# Patient Record
Sex: FEMALE | Race: WHITE | Employment: PART TIME | URBAN - METROPOLITAN AREA
[De-identification: names, ages, dates, MRNs, and addresses within clinical notes are randomized per-mention and may not be internally consistent; named-entity substitution may affect disease eponyms.]

---

## 2017-09-20 ENCOUNTER — HOSPITAL ENCOUNTER (OUTPATIENT)
Dept: MAMMOGRAPHY | Age: 57
Discharge: HOME | End: 2017-09-20
Attending: NURSE PRACTITIONER | Admitting: NURSE PRACTITIONER

## 2017-09-20 DIAGNOSIS — Z12.31 VISIT FOR SCREENING MAMMOGRAM: ICD-10-CM

## 2021-01-12 ENCOUNTER — HOSPITAL ENCOUNTER (OUTPATIENT)
Dept: MAMMOGRAPHY | Age: 61
Discharge: HOME | End: 2021-01-12
Payer: COMMERCIAL

## 2021-01-12 ENCOUNTER — HOSPITAL ENCOUNTER (OUTPATIENT)
Dept: ULTRASOUND IMAGING | Age: 61
Discharge: HOME | End: 2021-01-12
Payer: COMMERCIAL

## 2021-01-12 DIAGNOSIS — R22.1 NECK MASS: ICD-10-CM

## 2021-01-12 DIAGNOSIS — Z12.31 BREAST CANCER SCREENING BY MAMMOGRAM: ICD-10-CM

## 2021-01-12 PROCEDURE — 76536 US EXAM OF HEAD AND NECK: CPT

## 2021-01-12 PROCEDURE — 77067 SCR MAMMO BI INCL CAD: CPT

## 2021-02-19 ENCOUNTER — HOSPITAL ENCOUNTER (OUTPATIENT)
Dept: WOMENS IMAGING | Age: 61
Discharge: HOME | End: 2021-02-19
Payer: COMMERCIAL

## 2021-02-19 DIAGNOSIS — R92.8 ABNORMAL MAMMOGRAM: ICD-10-CM

## 2021-02-19 PROCEDURE — 76642 ULTRASOUND BREAST LIMITED: CPT

## 2021-02-19 PROCEDURE — G0279 TOMOSYNTHESIS, MAMMO: HCPCS

## 2021-02-19 NOTE — PROGRESS NOTES
[place]    [facility], [address] [place] [address]   Phone: [phone]         ULTRASOUND BIOPSY EDUCATION    NAME:  Anna Chávez OF BIRTH:  1960   MEDICAL RECORD NUMBER:  [id number]   TODAY'S DATE:  2/19/2021    Referring Physician: Dr. Beverley Lopez    Procedure: U/S Core Bx and Stereotactic Bx    Bilateral   2 right breast ultrasound biopsies  1 right axillary lymph node biopsy  1 right breast stereotactic biopsy  2 left breast stereotactic biopsies    Date of biopsy: 2/24/20201    Patient taking blood thinners: yes - ASA 81mg, holding 2 days prior    Medicine allergies: yes - PCN    Special Instructions: Patient's sister, Laek Quesada, with patient. [place] and other sister, Corey South, will be driving patient to biopsy. Patient does not drive. Biopsy order form faxed to referring MD.          What is an Ultrasound Guided Breast Biopsy? Ultrasound guided breast biopsy is a test that uses ultrasound to find an area of your breast where a tissue sample will be taken. The sample is then looked at under a microscope to check for signs of breast cancer. Why is it done? An Ultrasound biopsy is usually done to check for cancer in a lump or cyst found during a mammogram or ultrasound. Preparing for the test?     * Take your medications as prescribed    You may eat and drink fluids before the test    Take a shower the evening or morning before the biopsy. What happens before the test?  Images are taken to find the exact site to be biopsied.   Your skin is washed with an alcohol prep.   You will be given an injection of medication to numb your breast.   What happens during the test?     Once your breast is numb, a small cut (incision) is made.   Using the imaging, the doctor will guide the needle into the biopsy area.   A sample of breast tissue is taken through the needle.   A small \"Clip\" or Marker is inserted into your breast to farzaneh the biopsy site.   The needle is removed and pressure put on the needle site to stop any bleeding.   A bandage will be placed over the site.   A post mammogram picture will be taken to document the clip placement. How long does the test take? Approximately 60 minutes. Most of the time is spent finding the area for the biopsy. What are the risks?   Bleeding: You may have some bleeding which can cause bruising, swelling,    or a bleeding under your skin.   Infection:  Signs of infection are redness, swelling, heat, or increasing pain    at the biopsy Site.   Sample size not adequate: This would require repeating the biopsy. What happens after the test?    The nurse will review your post biopsy instructions which include:         Placing an ice pack in your bra.    Wearing a firm fitting bra.    You may use Tylenol (Acetaminiphen) for discomfort. Lab results take about 2-3 business days. The Nurse Navigator or your doctor will call you with the results. Stereotactic Biopsy Education     What is it? Stereotactic breast biopsy is a test that uses imaging, such as  X-ray, to find an area of your breast where a tissue sample will be taken. The sample is viewed  under a microscope to check for signs of breast cancer. Reviewed the process of a biopsy - sitting in a chair with your breast compressed similar to a mammogram with frequent images taken throughout the procedure. Why is this test done? This type of breast biopsy is usually done to check for cancer in a lump or microcalcifications found during a mammogram.     How can you prepare for the test?    ? You may take your regular medications as prescribed. ? You may eat and drink before the test.  ? Take a shower the evening or morning before the biopsy.   1. 2. What happens before the test?   Mammogram images are taken to find the exact site for the biopsy. · Your skin is washed with an alcohol prep. ? You will be given an injection of medication to numb your breast.     1. What happens during the test?  When your breast is numb, a small cut is made in the skin. ? Using the imaging, the doctor will guide the needle into the biopsy area. ? A sample of breast tissue is taken through the needle. ? A small clip is inserted into your breast to farzaneh the biopsy site. ? The needle is removed and pressure put on the needle site to stop any bleeding. ? Steri Strips and a bandage will be placed over the site. How long does the test take? About 60 minutes. Most of the time is spent preparing for the images and finding the area for the biopsy. 1.  What are the risks? ? Bleeding: You may have some bleeding which can cause bruising, swelling, or hematoma. ? Infection: Signs of infection are redness, swelling, heat, or increasing pain at site. ? Sample is not adequate: This would require repeating the biopsy. What happens after the test?  The nurse will review your post biopsy instructions which include:        Placing an ice pack in your bra.   Wearing a firm fitting bra.   You may use Tylenol (Acetaminiphen) for discomfort. Lab results take about 2-3 business days. The Breast Navigator or your doctor will call you with the results. Pre Stereotactic Breast Biopsy:     (Please arrive 15 minutes early)                                                 [x] Blood thinner history reviewed with patient    [x] Take all your other medications on your normal routine schedule. [x] You may eat and drink as normal before your biopsy. [x] You may drive yourself. [x] Take a shower the night before or the morning of the biopsy. [x] No heavy lifting or exercise for 48 hours after the biopsy. [x] Printed Pre Stereotactic Biopsy Instructions were provided, reviewed with the patient and all questions were answered. Jus Burden RN  2/19/2021  11:09 AM                [facility] Information:   Should you experience any significant changes in your health or have questions about your care, please contact:  Jus Burden, Nurse Navigator with [facility], [address]  Monday-Friday. If you need help with your care outside these hours and cannot wait until we are again available, contact your Physician or go to the hospital emergency.

## 2021-02-24 ENCOUNTER — HOSPITAL ENCOUNTER (OUTPATIENT)
Dept: WOMENS IMAGING | Age: 61
Discharge: HOME | End: 2021-02-24
Payer: COMMERCIAL

## 2021-02-24 ENCOUNTER — HOSPITAL ENCOUNTER (OUTPATIENT)
Dept: WOMENS IMAGING | Age: 61
End: 2021-02-24
Payer: COMMERCIAL

## 2021-02-24 DIAGNOSIS — R92.8 ABNORMAL MAMMOGRAM OF RIGHT BREAST: ICD-10-CM

## 2021-02-24 DIAGNOSIS — R92.8 ABNORMAL MAMMOGRAM OF LEFT BREAST: ICD-10-CM

## 2021-02-24 DIAGNOSIS — R92.8 ABNORMAL MAMMOGRAM: ICD-10-CM

## 2021-02-24 DIAGNOSIS — R92.8 ABNORMAL MAMMOGRAM OF BOTH BREASTS: ICD-10-CM

## 2021-02-24 PROCEDURE — C1894 INTRO/SHEATH, NON-LASER: HCPCS

## 2021-02-24 PROCEDURE — G0279 TOMOSYNTHESIS, MAMMO: HCPCS

## 2021-02-24 PROCEDURE — 2720000010 US BREAST BIOPSY W LOC DEVICE EACH ADDL LESION RIGHT

## 2021-02-24 PROCEDURE — 19084 BX BREAST ADD LESION US IMAG: CPT

## 2021-02-24 PROCEDURE — 77065 DX MAMMO INCL CAD UNI: CPT

## 2021-02-24 PROCEDURE — 76098 X-RAY EXAM SURGICAL SPECIMEN: CPT

## 2021-02-24 PROCEDURE — 88305 TISSUE EXAM BY PATHOLOGIST: CPT

## 2021-02-24 PROCEDURE — 2709999900 US BREAST BIOPSY W LOC DEVICE EACH ADDL LESION RIGHT

## 2021-02-24 PROCEDURE — 88360 TUMOR IMMUNOHISTOCHEM/MANUAL: CPT

## 2021-02-24 PROCEDURE — 2709999900 MAM STEREO BREAST BX W LOC DEVICE 1ST LESION LEFT

## 2021-02-24 NOTE — LETTER
[address]   Phone: [phone]    [facility]        February 24, 2021     Patient: Larissa May   YOB: 1960   Date of Visit: 2/24/2021       To Whom It May Concern: It is my medical opinion that Ulysses Head may return to work on 2/25/2021 with the following restrictions: lifting/carrying not to exceed 5 lbs. Restrictions are lifted on Saturday, February 27th @ 3pm.      If you have any questions or concerns, please don't hesitate to call: [phone].     Sincerely,          Abbey Kenney RN

## 2021-02-24 NOTE — PROGRESS NOTES
Patient in [address] [place] [address] for breast biopsy. Radiologist reviewed procedure with patient, consent signed. Patient tolerated procedure well. Compression held. Site cleansed with chloraprep, steri strips and dry dressing applied. Ice pack provided. Reviewed discharge instructions with patient. Patient verbalized understanding and agreed to contact the Breast Navigator with any questions. Patient was A&Ox3 and steady on feet and discharged to waiting area. The [address]   Discharge Instructions  [facility]  [address]  [address]  Telephone: [phone]    NAME:  Prerna Gore OF BIRTH:  1960  GENDER: female  MEDICAL RECORD NUMBER:  [id number]  TODAY'S DATE:  2/24/2021    Discharge Instructions Breast Center:    Post Breast Biopsy Instructions     [x] You may remove your outer dressing in 24 hours and you may shower. \"Steri-strips\" tape will stay on for 5-7 days. [x] Place a cold pack inside your bra on top of the dressing for at least 3 hours, removing it every 15 minutes for 15 minutes after your biopsy. [x] Wear a firm fitting bra for at least 24 hours after your biopsy, including while you sleep. [x] Place an ice pack inside your bra on top of the dressing for at least 3 hours, removing it every 15 minutes for 15 minutes after your biopsy. [x] You may resume your held medications tomorrow, unless otherwise directed by your physician. [x] Your physician has instructed you to take Tylenol (Acetaminophen) the day of your biopsy for any discomfort. [x] Watch for excessive bleeding. If bleeding occurs apply pressure to site. Watch for signs of infection, increased pain, redness, swelling and heat. If this occurs call your physician. [x] Do not participate in any strenuous exercise for 48 hours after your biopsy and do not lift, pull or push anything over 5-10 lbs. [x] Results will be available in 2-3 working days. Your referring physician or the Nurse Navigator will call you with results. The Breast Center Information:   Should you experience any significant changes in your health or have questions about your care, please contact:  Liz Lyn or Sunny Villareal, Breast Navigators with [facility]  [phone]  Monday-Friday. If you need help with your care outside these hours and cannot wait until we are again available, contact your Physician or go to the hospital emergency room.         Electronically signed by Liz Lyn RN on 2/24/2021 at 3:03 PM

## 2021-02-26 ENCOUNTER — TELEPHONE (OUTPATIENT)
Dept: WOMENS IMAGING | Age: 61
End: 2021-02-26

## 2021-02-26 NOTE — TELEPHONE ENCOUNTER
Pathology for breast biopsy complete, radiologist confirms concordance. Reviewed breast biopsy results with patient's sister, Rufino Damon, per patient request, and answered all questions. The radiologist is recommending that the patient see a breast surgeon regarding biopsy results. Patient already has an appointment with Dr. Anish Laird on 3/5/21. Rufino Damon would like to call her sister with results (this was previously discussed with patient and patient agreeable). Path report faxed to [facility], . Breast Navigator will remain available for any questions. Pt verbalized understanding.       Libra Todd RN

## 2021-02-26 NOTE — PROGRESS NOTES
Gynecologic History  Menarche at age 16    She delivered her first child at age N/A, and did not breastfeed  Postmenopausal at age 64  No hysterectomy  Oral contraceptive use: no and is not currently taking  Hormone use: no and is not currently taking    Bra Size: 62 DD      Review of Systems   Constitutional: Negative for unexpected weight change. Eyes: Negative for visual disturbance. Respiratory: Negative for cough and shortness of breath. Cardiovascular: Negative for chest pain and palpitations. Gastrointestinal: Negative for abdominal pain. Musculoskeletal: Negative for arthralgias and myalgias. Neurological: Negative for headaches. Hematological: Negative for adenopathy. Does not bruise/bleed easily. Psychiatric/Behavioral: Negative for dysphoric mood. The patient is not nervous/anxious.

## 2021-03-05 ENCOUNTER — OFFICE VISIT (OUTPATIENT)
Dept: SURGERY | Age: 61
End: 2021-03-05
Payer: COMMERCIAL

## 2021-03-05 VITALS
WEIGHT: 293 LBS | SYSTOLIC BLOOD PRESSURE: 141 MMHG | HEART RATE: 74 BPM | HEIGHT: 63 IN | BODY MASS INDEX: 51.91 KG/M2 | RESPIRATION RATE: 16 BRPM | DIASTOLIC BLOOD PRESSURE: 84 MMHG | OXYGEN SATURATION: 97 % | TEMPERATURE: 97.2 F

## 2021-03-05 DIAGNOSIS — Z17.0 MALIGNANT NEOPLASM OF UPPER-OUTER QUADRANT OF RIGHT BREAST IN FEMALE, ESTROGEN RECEPTOR POSITIVE (HCC): Primary | ICD-10-CM

## 2021-03-05 DIAGNOSIS — Z91.89 AT RISK FOR LYMPHEDEMA: ICD-10-CM

## 2021-03-05 DIAGNOSIS — C50.411 MALIGNANT NEOPLASM OF UPPER-OUTER QUADRANT OF RIGHT BREAST IN FEMALE, ESTROGEN RECEPTOR POSITIVE (HCC): Primary | ICD-10-CM

## 2021-03-05 PROCEDURE — G8427 DOCREV CUR MEDS BY ELIG CLIN: HCPCS | Performed by: SURGERY

## 2021-03-05 PROCEDURE — 99205 OFFICE O/P NEW HI 60 MIN: CPT | Performed by: SURGERY

## 2021-03-05 PROCEDURE — G8484 FLU IMMUNIZE NO ADMIN: HCPCS | Performed by: SURGERY

## 2021-03-05 PROCEDURE — G8417 CALC BMI ABV UP PARAM F/U: HCPCS | Performed by: SURGERY

## 2021-03-05 PROCEDURE — G9899 SCRN MAM PERF RSLTS DOC: HCPCS | Performed by: SURGERY

## 2021-03-05 PROCEDURE — 3017F COLORECTAL CA SCREEN DOC REV: CPT | Performed by: SURGERY

## 2021-03-05 PROCEDURE — 1036F TOBACCO NON-USER: CPT | Performed by: SURGERY

## 2021-03-05 RX ORDER — ASPIRIN 81 MG/1
TABLET, COATED ORAL
COMMUNITY
Start: 2020-12-01

## 2021-03-05 RX ORDER — MULTIVITAMIN
1 TABLET ORAL DAILY
COMMUNITY

## 2021-03-05 RX ORDER — CLINDAMYCIN HYDROCHLORIDE 300 MG/1
CAPSULE ORAL
COMMUNITY
Start: 2021-03-04 | End: 2021-03-29

## 2021-03-05 RX ORDER — DICLOFENAC SODIUM 75 MG/1
75 TABLET, DELAYED RELEASE ORAL DAILY PRN
COMMUNITY
Start: 2021-01-28

## 2021-03-05 RX ORDER — DULAGLUTIDE 1.5 MG/.5ML
1.5 INJECTION, SOLUTION SUBCUTANEOUS WEEKLY
COMMUNITY
Start: 2021-01-04

## 2021-03-05 RX ORDER — LEVOTHYROXINE SODIUM 0.07 MG/1
75 TABLET ORAL DAILY
COMMUNITY
Start: 2021-02-23

## 2021-03-05 RX ORDER — ZIPRASIDONE HYDROCHLORIDE 80 MG/1
80 CAPSULE ORAL 2 TIMES DAILY WITH MEALS
COMMUNITY
Start: 2020-12-31

## 2021-03-05 RX ORDER — LISINOPRIL AND HYDROCHLOROTHIAZIDE 20; 12.5 MG/1; MG/1
1 TABLET ORAL DAILY
COMMUNITY
Start: 2021-02-26

## 2021-03-05 RX ORDER — HYDROCHLOROTHIAZIDE 12.5 MG/1
12.5 CAPSULE, GELATIN COATED ORAL EVERY MORNING
COMMUNITY
End: 2021-03-29

## 2021-03-05 RX ORDER — PEN NEEDLE, DIABETIC 31 GX5/16"
NEEDLE, DISPOSABLE MISCELLANEOUS
COMMUNITY
Start: 2021-03-04

## 2021-03-05 RX ORDER — INSULIN DEGLUDEC INJECTION 100 U/ML
36 INJECTION, SOLUTION SUBCUTANEOUS NIGHTLY
COMMUNITY

## 2021-03-05 RX ORDER — ATORVASTATIN CALCIUM 40 MG/1
40 TABLET, FILM COATED ORAL DAILY
COMMUNITY
Start: 2021-02-03

## 2021-03-05 RX ORDER — ISOPROPYL ALCOHOL 0.75 G/1
SWAB TOPICAL
COMMUNITY
Start: 2021-03-04

## 2021-03-05 RX ORDER — METFORMIN HYDROCHLORIDE 500 MG/1
1000 TABLET, EXTENDED RELEASE ORAL
COMMUNITY
Start: 2021-02-05

## 2021-03-05 RX ORDER — EMPAGLIFLOZIN 10 MG/1
10 TABLET, FILM COATED ORAL DAILY
COMMUNITY
Start: 2021-02-18

## 2021-03-05 RX ORDER — MECLIZINE HYDROCHLORIDE 25 MG/1
25 TABLET ORAL 3 TIMES DAILY PRN
COMMUNITY

## 2021-03-05 SDOH — HEALTH STABILITY: MENTAL HEALTH: HOW OFTEN DO YOU HAVE A DRINK CONTAINING ALCOHOL?: NEVER

## 2021-03-05 ASSESSMENT — ENCOUNTER SYMPTOMS
SHORTNESS OF BREATH: 0
ABDOMINAL PAIN: 0
COUGH: 0

## 2021-03-05 NOTE — PROGRESS NOTES
03/05/21    CHIEF COMPLAINT:  New diagnosis of right breast cancer. HISTORY OF PRESENT ILLNESS:  Roderick Sanderson is a 64 y.o. woman who requested that I evaluate her for her new diagnosis of right breast cancer. The patient states that she was undergoing her intial mammogram on 1/12/2021 when she was alerted to an abnormality in the bilateral breasts. She underwent additional imaging and ultimately a core biopsy, which confirmed an invasive breast cancer in the right breast.  She presents with her sister Dari Mcdermott today to discuss further management. The patient states that she herself has not noticed any abnormal masses in either breast.  She denies any change in the appearance of her breasts or the skin of her breasts. She denies bilateral nipple discharge. She has no other systemic complaints and otherwise feels well today. GYNECOLOGIC HISTORY:  Menarche at age 17  G1 [de-identified]  She delivered her first child at age N/A, and did not breastfeed  Postmenopausal at age 64  No hysterectomy  Oral contraceptive use: no and is not currently taking  Hormone use: no and is not currently taking    History reviewed. No pertinent past medical history.   Past Surgical History:   Procedure Laterality Date    [facility] BREAST BIOPSY LEFT Left 2/24/2021    [facility]OPSY LEFT 2/24/2021 [facility]    [facility] BREAST BIOPSY LEFT Left 2/24/2021    [facility] BREAST BIOPSY LEFT 2/24/2021 [facility]    US BREAST BIOPSY NEEDLE ADDITIONAL RIGHT Right 2/24/2021    US BREAST BIOPSY NEEDLE ADDITIONAL RIGHT 2/24/2021 Jace Nascimento MD [facility]    US BREAST BIOPSY NEEDLE ADDITIONAL RIGHT Right 2/24/2021    US BREAST BIOPSY NEEDLE ADDITIONAL RIGHT 2/24/2021 Jace Nascimento MD [facility]    US BREAST NEEDLE BIOPSY RIGHT Right 2/24/2021    US BREAST NEEDLE BIOPSY RIGHT 2/24/2021 Jace Nascimento, [id number] [facility] [id number]     Current Outpatient Medications   Medication Sig Dispense Refill    ASPIRIN LOW DOSE 81 MG EC tablet       atorvastatin (LIPITOR) 40 MG tablet       clindamycin (CLEOCIN) 300 MG capsule       diclofenac (VOLTAREN) 75 MG EC tablet       TRULICITY 1.5 GI/9.0HU SOPN       JARDIANCE 10 MG tablet       hydroCHLOROthiazide (MICROZIDE) 12.5 MG capsule Take 12.5 mg by mouth every morning      Insulin Degludec (TRESIBA FLEXTOUCH) 100 UNIT/ML SOPN Inject 25 Units into the skin nightly      B-D ULTRAFINE III SHORT PEN 31G X 8 MM MISC       levothyroxine (SYNTHROID) 75 MCG tablet       lisinopril-hydroCHLOROthiazide (PRINZIDE;ZESTORETIC) 20-12.5 MG per tablet       meclizine (ANTIVERT) 25 MG tablet Take 25 mg by mouth 3 times daily as needed      metFORMIN (GLUCOPHAGE-XR) 500 MG extended release tablet       Multiple Vitamin (MULTIVITAMIN) tablet Take 1 tablet by mouth daily      sertraline (ZOLOFT) 50 MG tablet       SITagliptin (JANUVIA) 100 MG tablet Take 100 mg by mouth every morning      ziprasidone (GEODON) 80 MG capsule       Alcohol Swabs (B-D SINGLE USE SWABS REGULAR) PADS        No current facility-administered medications for this visit.       Allergies   Allergen Reactions    Penicillins Rash     Social History     Socioeconomic History    Marital status: Single     Spouse name: Not on file    Number of children: Not on file    Years of education: Not on file    Highest education level: Not on file   Occupational History    Not on file   Social Needs    Financial resource strain: Not on file    Food insecurity     Worry: Not on file     Inability: Not on file    Transportation needs     Medical: Not on file     Non-medical: Not on file   Tobacco Use    Smoking status: Former Smoker     Quit date: 2/6/2006     Years since quitting: 15.0    Smokeless tobacco: Never Used   Substance and Sexual Activity    Alcohol use: Never     Frequency: Never    Drug use: Never    Sexual activity: Not on file Lifestyle    Physical activity     Days per week: Not on file     Minutes per session: Not on file    Stress: Not on file   Relationships    Social connections     Talks on phone: Not on file     Gets together: Not on file     Attends Yazidi service: Not on file     Active member of club or organization: Not on file     Attends meetings of clubs or organizations: Not on file     Relationship status: Not on file    Intimate partner violence     Fear of current or ex partner: Not on file     Emotionally abused: Not on file     Physically abused: Not on file     Forced sexual activity: Not on file   Other Topics Concern    Not on file   Social History Narrative    Not on file     History reviewed. No pertinent family history. REVIEW OF SYSTEMS:   Constitutional: Negative for unexpected weight change. Eyes: Negative for visual disturbance. Respiratory: Negative for cough and shortness of breath. Cardiovascular: Negative for chest pain and palpitations. Gastrointestinal: Negative for abdominal pain. Musculoskeletal: Negative for arthralgias and myalgias. Neurological: Negative for headaches. Hematological: Negative for adenopathy. Does not bruise/bleed easily. Psychiatric/Behavioral: Negative for dysphoric mood. The patient is not nervous/anxious.       I personally reviewed and agree with the above ROS as documented by my medical assistant. PHYSICAL EXAMINATION:   Vitals: BP (!) 141/84 (Site: Left Lower Arm, Position: Sitting, Cuff Size: Medium Adult)   Pulse 74   Temp 97.2 °F (36.2 °C) (Infrared)   Resp 16   Ht 5' 3\" (1.6 m)   Wt (!) 334 lb (151.5 kg)   SpO2 97%   BMI 59.17 kg/m²   General: Well-developed, well-nourished, in no apparent distress. Eyes:  Conjunctivae appear normal. Pupils are equal and reactive. Extraocular movements are intact. The sclerae are not injected and show no jaundice. Nose, Mouth and Throat:  Patient is wearing a mask.   Neck: Supple, without There are calcifications in the left breast that were also recommended to undergo biopsy. She was given a BI-RADS 5. Breast density is described as heterogeneously dense tissue. PATHOLOGY:  I reviewed the bilateral breast and right axillary biopsy pathology from 2/24/2021 with her today as well. In the right breast at 9:00 and 12:00 as well as the right axilla, this demonstrated an invasive ductal carcinoma, grade 2, ER positive, NM positive, HER2 negative. In the left breast, at 7:00 and 12:00, this demonstrated benign breast tissue which was benign and concordant. IMPRESSION/RECOMMENDATION:  Cancer Staging  Malignant neoplasm of upper-outer quadrant of right breast in female, estrogen receptor positive ([facility] [address]  Staging form: Breast, AJCC 8th Edition  - Clinical stage from 3/5/2021: Stage IIA (cT2(m), cN1, cM0, G2, ER+, NM+, HER2-) - Signed by Jose Ferrara MD on 3/5/2021    Lizabeth Kim is a 64 y.o. woman who presents today for a consultation regarding her new diagnosis of right breast cancer in the upper outer quadrant. I reviewed the clinical, imaging, and pathology findings with her today. We discussed the treatment of breast cancer, which includes a combination of surgical therapy, systemic therapy, and radiation. We discussed her surgical therapy in great detail, and I explained the difference between breast conservation and mastectomy. Based on the clinical and imaging findings, I would favor mastectomy due to the overall span. We discussed reconstructive options after mastectomy. She is not interested in reconstruction and declined meeting with a plastic surgeon. We discussed that although we remove the breast in this procedure, there is still a risk of recurrent disease and reviewed expectations on residual breast tissue. We reviewed expected hospitalization and the need for surgical drains. I also reviewed the details of axillary surgery in the setting of breast cancer.  I discussed the technique of a sentinel lymph node biopsy as well as a level I and II axillary lymph node dissection. We discussed that axillary lymph node dissection would be the standard of care given the biopsy proven axillary disease, but that I would discuss this at our multi-disciplinary tumor board to see if a targeted sentinel lymph node biopsy would be an option to start given the likely need for post-mastectomy radiation as well. I explained the risks, benefits, and alternatives of this procedure which include, but are not limited to: anesthetic risk, bleeding, infection, wound complications, sensation changes, unappealing cosmetics, lymphedema, arm numbness, nerve injury, and the need to return to the operating room for a second procedure based on final pathology. She understands and wishes to proceed. First, I would like to obtain staging studies since this cancer was identified on her baseline mammogram, and we do not know how long this has been present. The risk of identifying metastatic disease on staging studies was discussed. If staging studies are negative, I will make arrangements to schedule surgery in the operating room as soon as possible. The patient was counseled at length about the risks of obed Covid-19 during their perioperative period and any recovery window from their procedure. The patient was made aware that obed Covid-19  may worsen their prognosis for recovering from their procedure  and lend to a higher morbidity and/or mortality risk. All material risks, benefits, and reasonable alternatives including postponing the procedure were discussed. The patient does wish to proceed with the procedure at this time. I am referring her to physical therapy for lymphedema education given her increased risk of lymphedema given the node positive disease, likely axillary lymph node dissection and post-mastectomy radiation, and elevated BMI.     We also discussed adjuvant radiation therapy. I explained to her that radiation therapy is recommended in patients undergoing breast conservation surgery and at times following mastectomy to reduce the risk of local recurrence. I will make arrangements for her to meet with a radiation oncologist post-operatively. Systemic therapy including chemotherapy and endocrine therapy were reviewed. She will certainly be a candidate for endocrine therapy. I will arrange for a medical oncology consultation post-operatively to discuss this further. Finally, we also discussed her personal and family history, the risk of a hereditary cancer syndrome, and the role of genetic counseling and testing. Based on her risk, we elected not to perform genetic testing at this time. I answered all of her and her family's questions thoroughly, and they do seem pleased with this plan of approach. I encouraged her to contact me in the interim if any new questions or concerns arise.     Summary:  - referral to PT/lymphedema therapy  - staging studies: CT chest/abdomen/pelvis and bone scan  - right simple mastectomy, right axillary lymph node dissection (versus targeted sentinel lymph node biopsy) planned if staging scans negative  - patient will need to see her PCP for surgical clearance within 30 days of surgery  - patient instructed on preoperative covid testing  - I will also present her case at our next multi-disciplinary tumor board    Ailyn Cook MD

## 2021-03-08 ENCOUNTER — TELEPHONE (OUTPATIENT)
Dept: SURGERY | Age: 61
End: 2021-03-08

## 2021-03-08 NOTE — TELEPHONE ENCOUNTER
Received call from Antoine Sam in regards to scheduling this patients surgery. Antoine Sam states that Tony Cadet is seeing her PCP on 03/18/21 and is having the bone scan done on 03/24/21 and would like to have her surgery on 04/06/21. Francisco Mon that COVID test would need to be done on 04/01/21, Antoine Sam states that this would be okay. Please advise of time for surgery on 04/06/21 if possible.

## 2021-03-24 ENCOUNTER — HOSPITAL ENCOUNTER (OUTPATIENT)
Dept: NUCLEAR MEDICINE | Age: 61
Discharge: HOME | End: 2021-03-24
Payer: COMMERCIAL

## 2021-03-24 ENCOUNTER — HOSPITAL ENCOUNTER (OUTPATIENT)
Dept: CT IMAGING | Age: 61
Discharge: HOME | End: 2021-03-24
Payer: COMMERCIAL

## 2021-03-24 DIAGNOSIS — C50.411 MALIGNANT NEOPLASM OF UPPER-OUTER QUADRANT OF RIGHT BREAST IN FEMALE, ESTROGEN RECEPTOR POSITIVE (HCC): ICD-10-CM

## 2021-03-24 DIAGNOSIS — Z17.0 MALIGNANT NEOPLASM OF UPPER-OUTER QUADRANT OF RIGHT BREAST IN FEMALE, ESTROGEN RECEPTOR POSITIVE (HCC): ICD-10-CM

## 2021-03-24 PROCEDURE — 3430000000 HC RX DIAGNOSTIC RADIOPHARMACEUTICAL: Performed by: SURGERY

## 2021-03-24 PROCEDURE — 74177 CT ABD & PELVIS W/CONTRAST: CPT

## 2021-03-24 PROCEDURE — 78306 BONE IMAGING WHOLE BODY: CPT

## 2021-03-24 PROCEDURE — 6360000004 HC RX CONTRAST MEDICATION: Performed by: SURGERY

## 2021-03-24 PROCEDURE — A9503 TC99M MEDRONATE: HCPCS | Performed by: SURGERY

## 2021-03-24 RX ORDER — TC 99M MEDRONATE 20 MG/10ML
26 INJECTION, POWDER, LYOPHILIZED, FOR SOLUTION INTRAVENOUS
Status: COMPLETED | OUTPATIENT
Start: 2021-03-24 | End: 2021-03-24

## 2021-03-24 RX ADMIN — TC 99M MEDRONATE 26 MILLICURIE: 20 INJECTION, POWDER, LYOPHILIZED, FOR SOLUTION INTRAVENOUS at 07:45

## 2021-03-24 RX ADMIN — IOPAMIDOL 75 ML: 755 INJECTION, SOLUTION INTRAVENOUS at 07:55

## 2021-03-24 NOTE — RESULT ENCOUNTER NOTE
Called patient's sister Sameer Orta to review imaging results. No answer. Left VM to call office back. Please let her know that Ivana's images showed no evidence or cancer spread. I will schedule her surgery for 4/6 at 730am, as planned. Arrive at [place].  Please schedule Ivana's covid test and post op visit accordingly, and remind her to see her PCP if not already scheduled. Thanks!

## 2021-03-25 ENCOUNTER — TELEPHONE (OUTPATIENT)
Dept: WOMENS IMAGING | Age: 61
End: 2021-03-25

## 2021-03-25 ENCOUNTER — PREP FOR PROCEDURE (OUTPATIENT)
Dept: SURGERY | Age: 61
End: 2021-03-25

## 2021-03-25 RX ORDER — SODIUM CHLORIDE 0.9 % (FLUSH) 0.9 %
10 SYRINGE (ML) INJECTION PRN
Status: CANCELLED | OUTPATIENT
Start: 2021-03-25

## 2021-03-25 RX ORDER — SODIUM CHLORIDE 0.9 % (FLUSH) 0.9 %
10 SYRINGE (ML) INJECTION EVERY 12 HOURS SCHEDULED
Status: CANCELLED | OUTPATIENT
Start: 2021-03-25

## 2021-03-25 NOTE — TELEPHONE ENCOUNTER
Called and spoke with patient's sister, Eric Alston. Patient scheduled for right axilla LN localization tag placement on Friday, April 2nd, arriving at [phone]. Eric Alston will relay to patient and call NN with any issues.  Jinny Ballesteros RN

## 2021-03-30 ENCOUNTER — TELEPHONE (OUTPATIENT)
Dept: SURGERY | Age: 61
End: 2021-03-30

## 2021-03-31 ENCOUNTER — TELEPHONE (OUTPATIENT)
Dept: SURGERY | Age: 61
End: 2021-03-31

## 2021-03-31 NOTE — TELEPHONE ENCOUNTER
Call placed to this patients PCP's office in regards to lab work that was recently completed for upcoming surgery. Patient with noted abnormal lab values. Request placed for further information in regards to upcoming surgery. Spoke with Krishan Adams who will speak with [facility] provider and call this writer back.

## 2021-03-31 NOTE — TELEPHONE ENCOUNTER
Received call from Kristine with [address] in regards to this patient. Kristine states that this patient told her care coordinator that this office told her to stop taking her insulin for two weeks prior to surgery. This writer advised Kristine that is absolutely incorrect, this office makes no determinations/ gives no advice/ places no requirements on any diabetic medications, rather refers those decisons to PCP. Patients are always advised to speak with PCP regarding stopping or starting any medications unless prescribed by this office. Kristine states that is \"what we thought\", but needed to call and confirm. Advised that [facility] was supposed to be calling this office back with further information on surgery clearance and treatment for this patient. Ivana advises that Dr. Bk Valladares is not in the office today, and that staff will return this writers call tomorrow after they have spoken with Dr. Bk Valladares.

## 2021-04-01 ENCOUNTER — OFFICE VISIT (OUTPATIENT)
Dept: PRIMARY CARE CLINIC | Age: 61
End: 2021-04-01
Payer: COMMERCIAL

## 2021-04-01 ENCOUNTER — TELEPHONE (OUTPATIENT)
Dept: SURGERY | Age: 61
End: 2021-04-01

## 2021-04-01 DIAGNOSIS — C50.411 MALIGNANT NEOPLASM OF UPPER-OUTER QUADRANT OF RIGHT BREAST IN FEMALE, ESTROGEN RECEPTOR POSITIVE (HCC): Primary | ICD-10-CM

## 2021-04-01 DIAGNOSIS — Z01.818 PREOP TESTING: Primary | ICD-10-CM

## 2021-04-01 DIAGNOSIS — Z17.0 MALIGNANT NEOPLASM OF UPPER-OUTER QUADRANT OF RIGHT BREAST IN FEMALE, ESTROGEN RECEPTOR POSITIVE (HCC): Primary | ICD-10-CM

## 2021-04-01 LAB — SARS-COV-2: NOT DETECTED

## 2021-04-01 PROCEDURE — G8417 CALC BMI ABV UP PARAM F/U: HCPCS | Performed by: NURSE PRACTITIONER

## 2021-04-01 PROCEDURE — 99211 OFF/OP EST MAY X REQ PHY/QHP: CPT | Performed by: NURSE PRACTITIONER

## 2021-04-01 PROCEDURE — G8428 CUR MEDS NOT DOCUMENT: HCPCS | Performed by: NURSE PRACTITIONER

## 2021-04-01 NOTE — TELEPHONE ENCOUNTER
TELEPHONE NOTE    I called to discuss the case with Ivana's sister, Joseph Akins. We received lab results which showed glu >398 and A1C >15.5. Given these results, she is at a significantly increased risk of wound healing complications (especially given the size of her mastectomy incision). Aside from possibly requiring extensive wound care, dressing changes, etc this would also result in a delay to her starting radiation. Joseph Akins notes that her sister is not compliant with her diabetic medication, however recently she has been trying to reiterate the importance of med compliance and Celestina Jessi has improved the past week. Given her uncontrolled diabetes, I told Joseph Akins that myself and  feel it would be safest for Celestina Bowen if we delay her surgery. 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 I will place a referral to medical oncology and ask them to see her next week. She has no additional questions at this time, and will speak with Celestina Bowen about cancelling surgery for Tuesday. We will also cancel her tag placement which was scheduled for tomorrow. Gareth Chan.  Raymond Love DO  Breast Surgical Oncology    [facility]  Phone: [phone] (option 3)

## 2021-04-01 NOTE — PROGRESS NOTES
[facility] for [facility]. They were educated on isolation and quarantine as appropriate. For any symptoms, they were directed to seek care from their PCP, given contact information to establish with a doctor, directed to an urgent care or the emergency room.

## 2021-04-01 NOTE — TELEPHONE ENCOUNTER
Call placed to [facility] to follow up on previous notes. Spoke with Nate Agrawal who states that in Dr. Waylon Martinez note from today it states that this patient has \"chronically uncontrolled diabetes, but that her recent lab work should not stop her from having surgery\". Writer asked Nate Agrawal to please fax note to this office so that providers may review. Nate Agrawal states that she will put in a message to have this faxed, and farzaneh message as high priority. Awaiting fax at this time.

## 2021-04-01 NOTE — PATIENT INSTRUCTIONS

## 2021-04-02 NOTE — TELEPHONE ENCOUNTER
After speaking with providers, this writer has spoken with [employer] [facility], and this patient will have an appointment on 4/8/21.

## 2021-04-05 NOTE — TELEPHONE ENCOUNTER
Called Ivana's sister Marti Min to follow up on re-scheduling of Ivana's surgery. She states that she thinks Michael Hart now understands importance of compliance with DM medications and has been more compliant over the last several days. She will continue to check in with Michael Hart to help her remember to take her meds. All questions answered. They have an appointment with [facility] on 4/8/21 to discuss endocrine therapy.

## 2021-04-23 ENCOUNTER — TELEPHONE (OUTPATIENT)
Dept: SURGERY | Age: 61
End: 2021-04-23

## 2021-04-23 NOTE — TELEPHONE ENCOUNTER
This is FYI for Dr. Junie Ramírez: Miguel Million called on behalf of her sister, Lyndon. Omar Masters wanted to let Dr. Junie Ramírez know that Ivana's PCP, Jarvis Latham, asked her to call. The reason for the call is to let Dr. Junie Ramírez know that Ivana's blood sugars are much better this week. The lowest was 103 and the highest was 166. Any questions, please call Omar Masters at [phone].

## 2021-06-11 ENCOUNTER — HOSPITAL ENCOUNTER (OUTPATIENT)
Dept: WOMENS IMAGING | Age: 61
Discharge: HOME | End: 2021-06-11
Payer: COMMERCIAL

## 2021-06-11 DIAGNOSIS — Z78.0 POSTMENOPAUSAL STATE: ICD-10-CM

## 2021-06-11 DIAGNOSIS — C50.411 MALIGNANT NEOPLASM OF UPPER-OUTER QUADRANT OF RIGHT FEMALE BREAST, UNSPECIFIED ESTROGEN RECEPTOR STATUS (HCC): ICD-10-CM

## 2021-06-11 PROCEDURE — 77080 DXA BONE DENSITY AXIAL: CPT

## 2021-08-20 ENCOUNTER — OFFICE VISIT (OUTPATIENT)
Dept: SURGERY | Age: 61
End: 2021-08-20
Payer: COMMERCIAL

## 2021-08-20 ENCOUNTER — HOSPITAL ENCOUNTER (OUTPATIENT)
Dept: WOMENS IMAGING | Age: 61
Discharge: HOME | End: 2021-08-20
Payer: COMMERCIAL

## 2021-08-20 VITALS — WEIGHT: 293 LBS | HEIGHT: 64 IN | RESPIRATION RATE: 18 BRPM | BODY MASS INDEX: 50.02 KG/M2

## 2021-08-20 DIAGNOSIS — Z85.3 HISTORY OF BREAST CANCER: ICD-10-CM

## 2021-08-20 DIAGNOSIS — Z17.0 MALIGNANT NEOPLASM OF UPPER-OUTER QUADRANT OF RIGHT BREAST IN FEMALE, ESTROGEN RECEPTOR POSITIVE (HCC): Primary | ICD-10-CM

## 2021-08-20 DIAGNOSIS — C50.411 MALIGNANT NEOPLASM OF UPPER-OUTER QUADRANT OF RIGHT BREAST IN FEMALE, ESTROGEN RECEPTOR POSITIVE (HCC): Primary | ICD-10-CM

## 2021-08-20 PROCEDURE — 99215 OFFICE O/P EST HI 40 MIN: CPT | Performed by: SURGERY

## 2021-08-20 PROCEDURE — G0279 TOMOSYNTHESIS, MAMMO: HCPCS

## 2021-08-20 PROCEDURE — G9899 SCRN MAM PERF RSLTS DOC: HCPCS | Performed by: SURGERY

## 2021-08-20 PROCEDURE — 3017F COLORECTAL CA SCREEN DOC REV: CPT | Performed by: SURGERY

## 2021-08-20 PROCEDURE — 1036F TOBACCO NON-USER: CPT | Performed by: SURGERY

## 2021-08-20 PROCEDURE — G8427 DOCREV CUR MEDS BY ELIG CLIN: HCPCS | Performed by: SURGERY

## 2021-08-20 PROCEDURE — 76642 ULTRASOUND BREAST LIMITED: CPT

## 2021-08-20 PROCEDURE — G8417 CALC BMI ABV UP PARAM F/U: HCPCS | Performed by: SURGERY

## 2021-08-20 RX ORDER — ANASTROZOLE 1 MG/1
TABLET ORAL
COMMUNITY
Start: 2021-06-29

## 2021-08-20 NOTE — PROGRESS NOTES
08/20/21    CHIEF COMPLAINT:  Follow up right breast cancer    HISTORY OF PRESENT ILLNESS:  Juanita Friedman is a 63 y.o. woman who requested that I evaluate her for her new diagnosis of right breast cancer. The patient states that she was undergoing her intial mammogram on 1/12/2021 when she was alerted to an abnormality in the bilateral breasts. She underwent additional imaging and ultimately a core biopsy, which confirmed an invasive breast cancer in the right breast.  We had initially planned a right simple mastectomy and targeted axillary lymph node dissection in April 2021 but the patient's blood sugars were poorly controlled and so we elected to start the patient on neoadjuvant endocrine therapy in order to delay surgery in order to obtain better blood glucose control. The patient returns today with a A1C < 9 down from >15.5. She reports that she as been taking Anastrozole daily and tolerating this well. She has also noted an improvement in her the right breast mass which she could previously feel. She presents with her sister Chandan Francisco today to discuss further management. The patient states that she herself has not noticed any abnormal masses in either breast.  She denies any change in the appearance of her breasts or the skin of her breasts. She denies bilateral nipple discharge. She has no other systemic complaints and otherwise feels well today.      GYNECOLOGIC HISTORY:  Menarche at age 17  G1 [de-identified]  She delivered her first child at age N/A, and did not breastfeed  Postmenopausal at age 64  No hysterectomy  Oral contraceptive use: no and is not currently taking  Hormone use: no and is not currently taking    Past Medical History:   Diagnosis Date    Diabetes mellitus ([address]     Hyperlipidemia     Hypertension      Past Surgical History:   Procedure Laterality Date    [facility] STEROTACTIC LOC BREAST BIOPSY LEFT Left 2/24/2021    [facility] STEROTACTIC LOC BREAST BIOPSY LEFT 2/24/2021 [facility]  [facility] STEROTACTIC LOC BREAST BIOPSY LEFT Left 2/24/2021    [facility] STEROT[facility] BREAST BIOPSY LEFT 2/24/2021 [facility]    US BREAST BIOPSY NEEDLE ADDITIONAL RIGHT Right 2/24/2021    US BREAST BIOPSY NEEDLE ADDITIONAL RIGHT 2/24/2021 Ericka Lee MD [facility]    US BREAST BIOPSY NEEDLE ADDITIONAL RIGHT Right 2/24/2021    US BREAST BIOPSY NEEDLE ADDITIONAL RIGHT 2/24/2021 Ericka Lee MD [facility]    US BREAST NEEDLE BIOPSY RIGHT Right 2/24/2021    US BREAST NEEDLE BIOPSY RIGHT 2/24/2021 Ericka Lee MD [facility]     Current Outpatient Medications   Medication Sig Dispense Refill    anastrozole (ARIMIDEX) 1 MG tablet       Insulin Zinc Extended Human (HUMULIN U SC) Inject 6 Units into the skin 3 times daily (before meals)      ASPIRIN LOW DOSE 81 MG EC tablet       atorvastatin (LIPITOR) 40 MG tablet Take 40 mg by mouth daily       diclofenac (VOLTAREN) 75 MG EC tablet Take 75 mg by mouth daily as needed       TRULICITY 1.5 RF/6.0PP SOPN 1.5 mg once a week       JARDIANCE 10 MG tablet 10 mg daily       Insulin Degludec (TRESIBA FLEXTOUCH) 100 UNIT/ML SOPN Inject 36 Units into the skin nightly       B-D ULTRAFINE III SHORT PEN 31G X 8 MM MISC       levothyroxine (SYNTHROID) 75 MCG tablet Take 75 mcg by mouth Daily       lisinopril-hydroCHLOROthiazide (PRINZIDE;ZESTORETIC) 20-12.5 MG per tablet Take 1 tablet by mouth daily       meclizine (ANTIVERT) 25 MG tablet Take 25 mg by mouth 3 times daily as needed      metFORMIN (GLUCOPHAGE-XR) 500 MG extended release tablet Take 1,000 mg by mouth daily (with breakfast)       Multiple Vitamin (MULTIVITAMIN) tablet Take 1 tablet by mouth daily      sertraline (ZOLOFT) 50 MG tablet Take 50 mg by mouth daily       SITagliptin (JANUVIA) 100 MG tablet Take 100 mg by mouth every morning      ziprasidone (GEODON) 80 MG capsule Take 80 mg by mouth 2 times daily (with meals)       Alcohol Swabs (B-D SINGLE USE SWABS REGULAR) PADS        No current facility-administered medications for this visit. Allergies   Allergen Reactions    Doxycycline     Penicillins Rash     Social History     Socioeconomic History    Marital status: Single     Spouse name: Not on file    Number of children: Not on file    Years of education: Not on file    Highest education level: Not on file   Occupational History    Not on file   Tobacco Use    Smoking status: Former Smoker     Quit date: 2/6/2006     Years since quitting: 15.5    Smokeless tobacco: Never Used   Vaping Use    Vaping Use: Never used   Substance and Sexual Activity    Alcohol use: Never    Drug use: Never    Sexual activity: Not on file   Other Topics Concern    Not on file   Social History Narrative    Not on file     Social Determinants of Health     Financial Resource Strain:     Difficulty of Paying Living Expenses:    Food Insecurity:     Worried About [address] [employer] in the Last Year:     [employer] [employer] in the Last Year:    Transportation Needs:     Lack of Transportation (Medical):  Lack of Transportation (Non-Medical):    Physical Activity:     Days of Exercise per Week:     Minutes of Exercise per Session:    Stress:     Feeling of Stress :    Social Connections:     Frequency of Communication with Friends and Family:     Frequency of Social Gatherings with Friends and Family:     Attends Buddhist Services:     Active Member of Clubs or Organizations:     Attends Club or Organization Meetings:     Marital Status:    Intimate Partner Violence:     Fear of Current or Ex-Partner:     Emotionally Abused:     Physically Abused:     Sexually Abused:      History reviewed. No pertinent family history. REVIEW OF SYSTEMS:   Constitutional: Negative for unexpected weight change. Eyes: Negative for visual disturbance. Respiratory: Negative for cough and shortness of breath.     Cardiovascular: Negative for chest pain and palpitations. Gastrointestinal: Negative for abdominal pain. Musculoskeletal: Negative for arthralgias and myalgias. Neurological: Negative for headaches. Hematological: Negative for adenopathy. Does not bruise/bleed easily. Psychiatric/Behavioral: Negative for dysphoric mood. The patient is not nervous/anxious. PHYSICAL EXAMINATION:   Vitals: Resp 18   Ht 5' 4\" (1.626 m)   Wt (!) 327 lb (148.3 kg)   BMI 56.13 kg/m²   General: Well-developed, well-nourished, in no apparent distress. Eyes:  Conjunctivae appear normal. Pupils are equal and reactive. Extraocular movements are intact. The sclerae are not injected and show no jaundice. Nose, Mouth and Throat:  Patient is wearing a mask. Neck: Supple, without thyromegaly or adenopathy. Respiratory: Normal respiratory effort, clear to auscultation bilaterally. Cardiovascular: Regular rate and rhythm. No lower extremity edema. Gastrointestinal: Soft, nontender, nondistended, without obvious masses or hernias. Musculoskeletal: Normal gait and range of motion in all 4 extremities. Psychiatric: Alert and oriented x 3. Appropriate affect and behavior for today's visit. Skin: No concerning rashes, lesions, nodules or other skin changes. Lymphatic System:  No concerning cervical, supraclavicular or axillary lymphadenopathy. Breast Exam: The breasts are normal in contour. Bra size DD. Right Breast: Examination of the right breast in the upright and supine positions reveals a faintly palpable density at 12:00 4 CFN and no palpable mass at 9:00 6 CFN. There are no other obvious masses, skin changes, dimpling, or retraction. The nipple and areola are without erosion, edema or ulceration. There is no obvious nipple discharge. There is no concerning axillary adenopathy. Left Breast: Examination of the left breast in the upright and supine positions reveal no obvious masses, skin changes, dimpling, or retraction.  The nipple and areola are without erosion, edema or ulceration. There is no obvious nipple discharge. There is no concerning axillary adenopathy. IMAGING: The breast imaging performed from February 2021 demonstrated that in the right breast, there are multiple suspicious masses from 9:00 to 12:00 spanning at least 6 x 8 cm. There are additional calcifications in the the upper outer breast remote from these sites which could be biopsied if it would alter surgical management. There are also at least 2 abnormal appearing lymph nodes in the right axilla. There are calcifications in the left breast that were also recommended to undergo biopsy. She was given a BI-RADS 5. Breast density is described as heterogeneously dense tissue. I personally reviewed the breast imaging performed from today which I ordered and discussed this with the radiologist and the patient. There has been interval decrease in the size of the known malignant masses at 9:00 and 12:00 as well as the non-biopsied mass-like areas at 11-12:00 compatible with a partial response to neoadjuvant endocrine therapy. These findings remain within a similar distribution to the pre-therapy mamograms, extending over an 8 x 7 cm area. There has also been interval decrease in the size of the known metastatic right axillary lymph node. Breast density is described as fibroglandular densities. PATHOLOGY:  I reviewed the bilateral breast and right axillary biopsy pathology from 2/24/2021 with her today as well. In the right breast at 9:00 and 12:00 as well as the right axilla, this demonstrated an invasive ductal carcinoma, grade 2, ER positive, AZ positive, HER2 negative. In the left breast, at 7:00 and 12:00, this demonstrated benign breast tissue which was benign and concordant.     IMPRESSION/RECOMMENDATION:  Cancer Staging  Malignant neoplasm of upper-outer quadrant of right breast in female, estrogen receptor positive ([facility] [address]  Staging form: Breast, AJCC 8th Edition  - Clinical stage from 3/5/2021: Stage IIA (cT2(m), cN1, cM0, G2, ER+, CA+, HER2-) - Signed by Josiah Lane MD on 3/5/2021    Josh Baldwin is a 63 y.o. woman who presents today for follow up regarding her diagnosis of right breast cancer in the upper outer quadrant. She is currently receiving neoadjuvant endocrine therapy with Anastrozole due to uncontrolled blood glucose levels, which have since improved. I reviewed the clinical, imaging, and pathology findings with her and her sister today. We discussed the treatment of breast cancer, which includes a combination of surgical therapy, systemic therapy, and radiation. We discussed her surgical therapy in great detail, and I explained the difference between breast conservation and mastectomy. Based on the clinical and imaging findings, I would favor mastectomy based on the overall span of disease. We discussed reconstructive options after mastectomy.  She is not interested in reconstruction and declined meeting with a plastic surgeon. Gus Simpson discussed that although we remove the breast in this procedure, there is still a risk of recurrent disease and reviewed expectations on residual breast tissue.  We reviewed expected hospitalization and the need for surgical drains. I also reviewed the details of axillary surgery in the setting of breast cancer. I discussed the technique of a sentinel lymph node biopsy as well as a level I and II axillary lymph node dissection.   We discussed that axillary lymph node dissection would be the standard of care given the biopsy proven axillary disease, but that given the recommendations for post mastectomy radiation and our prior discussion at our multi-disciplinary tumor board we have opted for a targeted sentinel lymph node biopsy (targeted axillary lymph node dissection) in order to decrease the risk of lymphedema.  I explained the risks, benefits, and alternatives of this procedure which include, but are not limited to: anesthetic risk, bleeding, infection, wound complications, sensation changes, unappealing cosmetics, lymphedema, arm numbness, nerve injury, and the need to return to the operating room for a second procedure based on final pathology. She understands and wishes to proceed. We also discussed adjuvant radiation therapy. I explained to her that radiation therapy is recommended in patients undergoing breast conservation surgery and at times following mastectomy to reduce the risk of local recurrence. I will make arrangements for her to meet with a radiation oncologist post-operatively. Systemic therapy was reviewed. She will continue to follow with medical oncology for endocrine therapy. I answered all of her and her family's questions thoroughly, and they do seem pleased with this plan of approach. I encouraged her to contact me in the interim if any new questions or concerns arise.     Summary:  - referral to PT/lymphedema therapy  - right simple mastectomy, right targeted sentinel lymph node biopsy scheduled for 9/14/2021  - patient will need to see her PCP for surgical clearance within 30 days of surgery    Bautista Bo MD

## 2021-08-23 NOTE — PROGRESS NOTES
Robles Opolis    Age 63 y.o.    female    1960    MRN [id number]    9/14/2021  Arrival Time_____________  OR Time____________175 Min     Procedure(s):  RIGHT SIMPLE MASTECTOMY, RIGHT RADIOFREQUENCY IDENTIFICATION TAG LOCALIZED SENTINEL LYMPH NODE BIOPSY  [facility] CODE - [id number]                      Monitor Anesthesia Care     Surgeon(s):  Bhanu Leung, MD      DAY ADMIT ___  SDS/OP ___  OUTPT IN BED ___         Phone [phone] (home) [phone] (work)   PCP _____________________ Phone_________________ [address] ( ) Epic CE ( ) Appt ________    ADDITIONAL INFO __________________________________ Cardio/Consult _____________    NOTES _____________________________________________________________________    ____________________________________________________________________________    PAT APPT DATE:________ TIME: ________  FAXED QAD: _______  (__) H&P w/ hospitalist  ____________________________________________________________________________    COVID TEST: Date/Location______________        NURSING HISTORY COMPLETE: _______  (__) CBC       (__) W/ DIFF ___________  (__)  ECHO    __________  (__) Hgb A1C    ___________  (__) CHEST X RAY   __________  (__) LIPID PROFILE  ___________  (__) EKG   __________  (__) PT/PTT   ___________  (__) PFT's   __________  (__) BMP   ___________  (__) CAROTIDS  __________  (__) CMP   ___________  (__) VEIN MAPPING  __________  (__) U/A   ___________  (__) HISTORY & PHYSICAL __________  (__) URINE C & S  ___________  (__) CARDIAC CLEARANCE __________  (__) U/A W/ FLEX  ___________  (__) PULM.  CLEARANCE __________  (__) SERUM PREGNANCY ___________  (__) Check Epic DOS orders __________  (__) TYPE & SCREEN ________ repeat ( ) (__)  __________________ __________  (__) ALBUMIN   ___________  (__)  __________________ __________  (__) TRANSFERRIN  ___________  (__)  __________________ __________  (__) LIVER PROFILE  ___________  (__)  __________________ __________  (__) NLBERNARD HGB  ___________  (__) URINE PREG DOS __________  (__) NICOTINE & MET.  ___________  (__) BLOOD SUGAR DOS __________  (__) PREALBUMIN  ___________    (__) MRSA NASAL SWAB ___________  (__) BLOOD THINNERS __________  (__) ACE/ ARBS: _____________________    (__) BETABLOCKERS ___________________

## 2021-08-31 ENCOUNTER — TELEPHONE (OUTPATIENT)
Dept: SURGERY | Age: 61
End: 2021-08-31

## 2021-08-31 NOTE — TELEPHONE ENCOUNTER
OM for [facility] Cover to call back re: Ivana's surgery time.   New Surgery time is 730 am with an arrival of 6am.

## 2021-09-08 ENCOUNTER — HOSPITAL ENCOUNTER (OUTPATIENT)
Dept: WOMENS IMAGING | Age: 61
Discharge: HOME | End: 2021-09-08
Payer: COMMERCIAL

## 2021-09-08 DIAGNOSIS — R92.8 ABNORMAL MAMMOGRAM: ICD-10-CM

## 2021-09-08 DIAGNOSIS — Z17.0 MALIGNANT NEOPLASM OF UPPER-OUTER QUADRANT OF RIGHT BREAST IN FEMALE, ESTROGEN RECEPTOR POSITIVE (HCC): ICD-10-CM

## 2021-09-08 DIAGNOSIS — C50.411 MALIGNANT NEOPLASM OF UPPER-OUTER QUADRANT OF RIGHT BREAST IN FEMALE, ESTROGEN RECEPTOR POSITIVE (HCC): ICD-10-CM

## 2021-09-08 PROCEDURE — 2709999900 US PLACE BREAST LOC DEVICE 1ST LESION RIGHT

## 2021-09-08 PROCEDURE — 77065 DX MAMMO INCL CAD UNI: CPT

## 2021-09-08 NOTE — PROGRESS NOTES
Patient in [address] [place] [address] for breast RFID placement. Radiologist reviewed procedure with patient, consent signed. Patient tolerated procedure well. Compression held. Site cleansed with chloraprep, steri strips and dry dressing applied. Ice pack provided. Reviewed discharge instructions with patient. Patient verbalized understanding and agreed to contact the Breast Navigator with any questions. Patient was A&O x 3, steady on feet, and discharged to waiting area. The [facility]   Discharge Instructions  [facility]  [address]  Telephone: [phone]   FAX [phone]      Apply an ice pack for 15-20 minutes after your procedure for at least one hour.  Leave the steri-strips and outer dressing on overnight. Remove outer dressing the following day but keep the steri strips on until they fall off. After showering, make sure you pat dry the area.  You may return to work after the tag placement with the following restrictions: no lifting, pulling, or pushing anything over 5 lbs for the rest of the day. Please refrain from repetitive movement on the affected side for the rest of the day.  Watch for signs of infection: swelling, pain fever, tenderness, heat or redness around the site. Do not soak in a hot tub, pool, or bath until the site has healed.  The local anesthetic wears off about 3 hours after the procedure. You may use Tylenol for discomfort if needed. Bruising and tenderness are normal following the procedure.  You may resume all stopped medications unless otherwise indicated by your Breast Surgeon. For any questions, please call the Nurse Navigator, Gilford Lime, for any issues at: [phone].      Thank you! -[facility]

## 2021-09-09 ENCOUNTER — TELEPHONE (OUTPATIENT)
Dept: SURGERY | Age: 61
End: 2021-09-09

## 2021-09-09 NOTE — TELEPHONE ENCOUNTER
Howell Bathantoinette returned call. She stated the call is regarding the pt needing a Covid test prior to her surgery. Pt caregiver is her sister Dalton Cline, and when Howell Jessi told Dalton Cline that pt needed a Covid test prior to surgery, Dalton Cline replied that Dr. Azalea Freedman did not tell them that. Dante Bowen tried to explain that it is the hospital policy at [facility] as of Sept 6th that everyone scheduled for surgery at [facility] has to have a covid test, and that Dr. Azalea Freedman might not have been aware of that policy.  Dalton Cline wants a call from Dr. Azalea Freedman or Sarah Merlos to explain and to specifically tell her that pt needs a covid test.

## 2021-09-09 NOTE — TELEPHONE ENCOUNTER
Attempted to return Kims call. Unable to reach Nery Gonzalez. Left voicemail with request for return call to this number. If Nery Gonzalez calls back and Sylvie Bloch is unavailable please make a note of what this in regard to and if it is emergent.

## 2021-09-09 NOTE — TELEPHONE ENCOUNTER
Please call [facility] regarding this pt. Her call back number is [phone].

## 2021-09-10 ENCOUNTER — ANESTHESIA EVENT (OUTPATIENT)
Dept: OPERATING ROOM | Age: 61
End: 2021-09-10
Payer: COMMERCIAL

## 2021-09-10 NOTE — PROGRESS NOTES
Preoperative Screening for Elective Surgery/Invasive Procedures While COVID-19 present in the community     Have you had any of the following symptoms? o Fever, chills  o Cough  o Shortness of breath  o Muscle aches/pain  o Diarrhea  o Abdominal pain, nausea, vomiting  o Loss or decrease in taste and / or smell   Risk of Exposure  o Have you recently been hospitalized for COVID-19 or flu-like illness, if so when?  o Recently diagnosed with COVID-19, if so when?  o Recently tested for COVID-19, if so when?  o Have you been in close contact with a person or family member who currently has or recently had COVID-19? If yes, when and in what context?  o Do you live with anybody who in the last 14 days has had fever, chills, shortness of breath, muscle aches, flu-like illness?  o Do you have any close contacts or family members who are currently in the hospital for COVID-19 or flu-like illness? If yes, assess recent close contact with this person. Indicate if the patient has a positive screen by answering yes to one or more of the above questions. Patients who test positive or screen positive prior to surgery or on the day of surgery should be evaluated in conjunction with the surgeon/proceduralist/anesthesiologist to determine the urgency of the procedure.      No to all questions / Obtained Covid test on 9/10 at [facility] in [place]

## 2021-09-13 ENCOUNTER — PREP FOR PROCEDURE (OUTPATIENT)
Dept: SURGERY | Age: 61
End: 2021-09-13

## 2021-09-13 DIAGNOSIS — C50.411 MALIGNANT NEOPLASM OF UPPER-OUTER QUADRANT OF RIGHT BREAST IN FEMALE, ESTROGEN RECEPTOR POSITIVE (HCC): Primary | ICD-10-CM

## 2021-09-13 DIAGNOSIS — Z17.0 MALIGNANT NEOPLASM OF UPPER-OUTER QUADRANT OF RIGHT BREAST IN FEMALE, ESTROGEN RECEPTOR POSITIVE (HCC): Primary | ICD-10-CM

## 2021-09-13 RX ORDER — SODIUM CHLORIDE 9 MG/ML
25 INJECTION, SOLUTION INTRAVENOUS PRN
Status: CANCELLED | OUTPATIENT
Start: 2021-09-13

## 2021-09-13 RX ORDER — SODIUM CHLORIDE 0.9 % (FLUSH) 0.9 %
10 SYRINGE (ML) INJECTION EVERY 12 HOURS SCHEDULED
Status: CANCELLED | OUTPATIENT
Start: 2021-09-13

## 2021-09-13 RX ORDER — SODIUM CHLORIDE 0.9 % (FLUSH) 0.9 %
10 SYRINGE (ML) INJECTION PRN
Status: CANCELLED | OUTPATIENT
Start: 2021-09-13

## 2021-09-14 ENCOUNTER — HOSPITAL ENCOUNTER (OUTPATIENT)
Dept: WOMENS IMAGING | Age: 61
Setting detail: OUTPATIENT SURGERY
Discharge: HOME | End: 2021-09-14
Attending: SURGERY
Payer: COMMERCIAL

## 2021-09-14 ENCOUNTER — HOSPITAL ENCOUNTER (OUTPATIENT)
Age: 61
Setting detail: OUTPATIENT SURGERY
Discharge: HOME | End: 2021-09-14
Attending: SURGERY | Admitting: SURGERY
Payer: COMMERCIAL

## 2021-09-14 ENCOUNTER — HOSPITAL ENCOUNTER (OUTPATIENT)
Dept: NUCLEAR MEDICINE | Age: 61
Discharge: HOME | End: 2021-09-14
Attending: SURGERY
Payer: COMMERCIAL

## 2021-09-14 ENCOUNTER — ANESTHESIA (OUTPATIENT)
Dept: OPERATING ROOM | Age: 61
End: 2021-09-14
Payer: COMMERCIAL

## 2021-09-14 VITALS
TEMPERATURE: 98.4 F | SYSTOLIC BLOOD PRESSURE: 140 MMHG | HEIGHT: 64 IN | BODY MASS INDEX: 50.02 KG/M2 | HEART RATE: 71 BPM | DIASTOLIC BLOOD PRESSURE: 77 MMHG | RESPIRATION RATE: 16 BRPM | OXYGEN SATURATION: 93 % | WEIGHT: 293 LBS

## 2021-09-14 VITALS
DIASTOLIC BLOOD PRESSURE: 81 MMHG | RESPIRATION RATE: 1 BRPM | OXYGEN SATURATION: 97 % | SYSTOLIC BLOOD PRESSURE: 144 MMHG

## 2021-09-14 DIAGNOSIS — Z17.0 MALIGNANT NEOPLASM OF UPPER-OUTER QUADRANT OF RIGHT BREAST IN FEMALE, ESTROGEN RECEPTOR POSITIVE (HCC): Primary | ICD-10-CM

## 2021-09-14 DIAGNOSIS — C50.411 MALIGNANT NEOPLASM OF UPPER-OUTER QUADRANT OF RIGHT BREAST IN FEMALE, ESTROGEN RECEPTOR POSITIVE (HCC): ICD-10-CM

## 2021-09-14 DIAGNOSIS — Z17.0 MALIGNANT NEOPLASM OF UPPER-OUTER QUADRANT OF RIGHT BREAST IN FEMALE, ESTROGEN RECEPTOR POSITIVE (HCC): ICD-10-CM

## 2021-09-14 DIAGNOSIS — C50.411 MALIGNANT NEOPLASM OF UPPER-OUTER QUADRANT OF RIGHT BREAST IN FEMALE, ESTROGEN RECEPTOR POSITIVE (HCC): Primary | ICD-10-CM

## 2021-09-14 DIAGNOSIS — C50.411 MALIGNANT NEOPLASM OF UPPER-OUTER QUADRANT OF RIGHT FEMALE BREAST, UNSPECIFIED ESTROGEN RECEPTOR STATUS (HCC): ICD-10-CM

## 2021-09-14 LAB
GLUCOSE BLD-MCNC: 163 MG/DL (ref 70–99)
GLUCOSE BLD-MCNC: 199 MG/DL (ref 70–99)
PERFORMED ON: ABNORMAL
PERFORMED ON: ABNORMAL

## 2021-09-14 PROCEDURE — 3700000000 HC ANESTHESIA ATTENDED CARE: Performed by: SURGERY

## 2021-09-14 PROCEDURE — 2580000003 HC RX 258: Performed by: ANESTHESIOLOGY

## 2021-09-14 PROCEDURE — 3430000000 HC RX DIAGNOSTIC RADIOPHARMACEUTICAL: Performed by: SURGERY

## 2021-09-14 PROCEDURE — 38792 RA TRACER ID OF SENTINL NODE: CPT

## 2021-09-14 PROCEDURE — 19303 MAST SIMPLE COMPLETE: CPT | Performed by: SURGERY

## 2021-09-14 PROCEDURE — A9541 TC99M SULFUR COLLOID: HCPCS | Performed by: SURGERY

## 2021-09-14 PROCEDURE — 7100000010 HC PHASE II RECOVERY - FIRST 15 MIN: Performed by: SURGERY

## 2021-09-14 PROCEDURE — 2500000003 HC RX 250 WO HCPCS: Performed by: NURSE ANESTHETIST, CERTIFIED REGISTERED

## 2021-09-14 PROCEDURE — 2580000003 HC RX 258: Performed by: SURGERY

## 2021-09-14 PROCEDURE — 3700000001 HC ADD 15 MINUTES (ANESTHESIA): Performed by: SURGERY

## 2021-09-14 PROCEDURE — 38900 IO MAP OF SENT LYMPH NODE: CPT | Performed by: SURGERY

## 2021-09-14 PROCEDURE — 6360000002 HC RX W HCPCS: Performed by: ANESTHESIOLOGY

## 2021-09-14 PROCEDURE — 38525 BIOPSY/REMOVAL LYMPH NODES: CPT | Performed by: SURGERY

## 2021-09-14 PROCEDURE — 7100000000 HC PACU RECOVERY - FIRST 15 MIN: Performed by: SURGERY

## 2021-09-14 PROCEDURE — 88305 TISSUE EXAM BY PATHOLOGIST: CPT

## 2021-09-14 PROCEDURE — 3600000014 HC SURGERY LEVEL 4 ADDTL 15MIN: Performed by: SURGERY

## 2021-09-14 PROCEDURE — 76098 X-RAY EXAM SURGICAL SPECIMEN: CPT

## 2021-09-14 PROCEDURE — 6360000002 HC RX W HCPCS: Performed by: NURSE ANESTHETIST, CERTIFIED REGISTERED

## 2021-09-14 PROCEDURE — 3600000004 HC SURGERY LEVEL 4 BASE: Performed by: SURGERY

## 2021-09-14 PROCEDURE — 6360000002 HC RX W HCPCS: Performed by: SURGERY

## 2021-09-14 PROCEDURE — 2709999900 HC NON-CHARGEABLE SUPPLY: Performed by: SURGERY

## 2021-09-14 PROCEDURE — 88307 TISSUE EXAM BY PATHOLOGIST: CPT

## 2021-09-14 PROCEDURE — 7100000011 HC PHASE II RECOVERY - ADDTL 15 MIN: Performed by: SURGERY

## 2021-09-14 PROCEDURE — C9290 INJ, BUPIVACAINE LIPOSOME: HCPCS | Performed by: SURGERY

## 2021-09-14 PROCEDURE — 7100000001 HC PACU RECOVERY - ADDTL 15 MIN: Performed by: SURGERY

## 2021-09-14 RX ORDER — MAGNESIUM HYDROXIDE 1200 MG/15ML
LIQUID ORAL CONTINUOUS PRN
Status: COMPLETED | OUTPATIENT
Start: 2021-09-14 | End: 2021-09-14

## 2021-09-14 RX ORDER — LIDOCAINE HYDROCHLORIDE 20 MG/ML
INJECTION, SOLUTION INFILTRATION; PERINEURAL PRN
Status: DISCONTINUED | OUTPATIENT
Start: 2021-09-14 | End: 2021-09-14 | Stop reason: SDUPTHER

## 2021-09-14 RX ORDER — MORPHINE SULFATE 2 MG/ML
1 INJECTION, SOLUTION INTRAMUSCULAR; INTRAVENOUS EVERY 5 MIN PRN
Status: DISCONTINUED | OUTPATIENT
Start: 2021-09-14 | End: 2021-09-14 | Stop reason: HOSPADM

## 2021-09-14 RX ORDER — SODIUM CHLORIDE 9 MG/ML
25 INJECTION, SOLUTION INTRAVENOUS PRN
Status: DISCONTINUED | OUTPATIENT
Start: 2021-09-14 | End: 2021-09-14 | Stop reason: HOSPADM

## 2021-09-14 RX ORDER — SODIUM CHLORIDE 0.9 % (FLUSH) 0.9 %
10 SYRINGE (ML) INJECTION PRN
Status: DISCONTINUED | OUTPATIENT
Start: 2021-09-14 | End: 2021-09-14 | Stop reason: HOSPADM

## 2021-09-14 RX ORDER — MORPHINE SULFATE 2 MG/ML
2 INJECTION, SOLUTION INTRAMUSCULAR; INTRAVENOUS EVERY 5 MIN PRN
Status: DISCONTINUED | OUTPATIENT
Start: 2021-09-14 | End: 2021-09-14 | Stop reason: HOSPADM

## 2021-09-14 RX ORDER — FENTANYL CITRATE 50 UG/ML
INJECTION, SOLUTION INTRAMUSCULAR; INTRAVENOUS PRN
Status: DISCONTINUED | OUTPATIENT
Start: 2021-09-14 | End: 2021-09-14 | Stop reason: SDUPTHER

## 2021-09-14 RX ORDER — MAGNESIUM SULFATE HEPTAHYDRATE 500 MG/ML
INJECTION, SOLUTION INTRAMUSCULAR; INTRAVENOUS PRN
Status: DISCONTINUED | OUTPATIENT
Start: 2021-09-14 | End: 2021-09-14 | Stop reason: SDUPTHER

## 2021-09-14 RX ORDER — ONDANSETRON 2 MG/ML
4 INJECTION INTRAMUSCULAR; INTRAVENOUS
Status: COMPLETED | OUTPATIENT
Start: 2021-09-14 | End: 2021-09-14

## 2021-09-14 RX ORDER — DEXAMETHASONE SODIUM PHOSPHATE 4 MG/ML
8 INJECTION, SOLUTION INTRA-ARTICULAR; INTRALESIONAL; INTRAMUSCULAR; INTRAVENOUS; SOFT TISSUE ONCE
Status: DISCONTINUED | OUTPATIENT
Start: 2021-09-14 | End: 2021-09-14 | Stop reason: HOSPADM

## 2021-09-14 RX ORDER — OXYCODONE HYDROCHLORIDE AND ACETAMINOPHEN 5; 325 MG/1; MG/1
2 TABLET ORAL PRN
Status: DISCONTINUED | OUTPATIENT
Start: 2021-09-14 | End: 2021-09-14 | Stop reason: HOSPADM

## 2021-09-14 RX ORDER — SODIUM CHLORIDE, SODIUM LACTATE, POTASSIUM CHLORIDE, CALCIUM CHLORIDE 600; 310; 30; 20 MG/100ML; MG/100ML; MG/100ML; MG/100ML
INJECTION, SOLUTION INTRAVENOUS CONTINUOUS
Status: DISCONTINUED | OUTPATIENT
Start: 2021-09-14 | End: 2021-09-14 | Stop reason: HOSPADM

## 2021-09-14 RX ORDER — MIDAZOLAM HYDROCHLORIDE 1 MG/ML
INJECTION INTRAMUSCULAR; INTRAVENOUS PRN
Status: DISCONTINUED | OUTPATIENT
Start: 2021-09-14 | End: 2021-09-14 | Stop reason: SDUPTHER

## 2021-09-14 RX ORDER — DEXAMETHASONE SODIUM PHOSPHATE 4 MG/ML
INJECTION, SOLUTION INTRA-ARTICULAR; INTRALESIONAL; INTRAMUSCULAR; INTRAVENOUS; SOFT TISSUE PRN
Status: DISCONTINUED | OUTPATIENT
Start: 2021-09-14 | End: 2021-09-14 | Stop reason: SDUPTHER

## 2021-09-14 RX ORDER — HYDROMORPHONE HCL 110MG/55ML
PATIENT CONTROLLED ANALGESIA SYRINGE INTRAVENOUS PRN
Status: DISCONTINUED | OUTPATIENT
Start: 2021-09-14 | End: 2021-09-14 | Stop reason: SDUPTHER

## 2021-09-14 RX ORDER — SODIUM CHLORIDE 0.9 % (FLUSH) 0.9 %
10 SYRINGE (ML) INJECTION EVERY 12 HOURS SCHEDULED
Status: DISCONTINUED | OUTPATIENT
Start: 2021-09-14 | End: 2021-09-14 | Stop reason: HOSPADM

## 2021-09-14 RX ORDER — KETAMINE HYDROCHLORIDE 100 MG/ML
INJECTION, SOLUTION INTRAMUSCULAR; INTRAVENOUS PRN
Status: DISCONTINUED | OUTPATIENT
Start: 2021-09-14 | End: 2021-09-14 | Stop reason: SDUPTHER

## 2021-09-14 RX ORDER — ISOSULFAN BLUE 50 MG/5ML
INJECTION, SOLUTION SUBCUTANEOUS PRN
Status: DISCONTINUED | OUTPATIENT
Start: 2021-09-14 | End: 2021-09-14 | Stop reason: ALTCHOICE

## 2021-09-14 RX ORDER — OXYCODONE HYDROCHLORIDE AND ACETAMINOPHEN 5; 325 MG/1; MG/1
1 TABLET ORAL PRN
Status: DISCONTINUED | OUTPATIENT
Start: 2021-09-14 | End: 2021-09-14 | Stop reason: HOSPADM

## 2021-09-14 RX ORDER — PROPOFOL 10 MG/ML
INJECTION, EMULSION INTRAVENOUS PRN
Status: DISCONTINUED | OUTPATIENT
Start: 2021-09-14 | End: 2021-09-14 | Stop reason: SDUPTHER

## 2021-09-14 RX ORDER — OXYCODONE HYDROCHLORIDE 5 MG/1
5 TABLET ORAL EVERY 6 HOURS PRN
Qty: 20 TABLET | Refills: 0 | Status: SHIPPED | OUTPATIENT
Start: 2021-09-14 | End: 2021-09-19

## 2021-09-14 RX ORDER — MEPERIDINE HYDROCHLORIDE 50 MG/ML
12.5 INJECTION INTRAMUSCULAR; INTRAVENOUS; SUBCUTANEOUS EVERY 5 MIN PRN
Status: DISCONTINUED | OUTPATIENT
Start: 2021-09-14 | End: 2021-09-14 | Stop reason: HOSPADM

## 2021-09-14 RX ORDER — ROCURONIUM BROMIDE 10 MG/ML
INJECTION, SOLUTION INTRAVENOUS PRN
Status: DISCONTINUED | OUTPATIENT
Start: 2021-09-14 | End: 2021-09-14 | Stop reason: SDUPTHER

## 2021-09-14 RX ORDER — ONDANSETRON 2 MG/ML
INJECTION INTRAMUSCULAR; INTRAVENOUS PRN
Status: DISCONTINUED | OUTPATIENT
Start: 2021-09-14 | End: 2021-09-14 | Stop reason: SDUPTHER

## 2021-09-14 RX ORDER — PROMETHAZINE HYDROCHLORIDE 25 MG/ML
6.25 INJECTION, SOLUTION INTRAMUSCULAR; INTRAVENOUS ONCE
Status: COMPLETED | OUTPATIENT
Start: 2021-09-14 | End: 2021-09-14

## 2021-09-14 RX ADMIN — ROCURONIUM BROMIDE 50 MG: 10 SOLUTION INTRAVENOUS at 07:39

## 2021-09-14 RX ADMIN — HYDROMORPHONE HYDROCHLORIDE 0.4 MG: 2 INJECTION INTRAMUSCULAR; INTRAVENOUS; SUBCUTANEOUS at 08:51

## 2021-09-14 RX ADMIN — Medication 1076 MICRO CURIE: at 08:22

## 2021-09-14 RX ADMIN — FENTANYL CITRATE 50 MCG: 50 INJECTION INTRAMUSCULAR; INTRAVENOUS at 08:44

## 2021-09-14 RX ADMIN — MAGNESIUM SULFATE HEPTAHYDRATE 2 G: 500 INJECTION, SOLUTION INTRAMUSCULAR; INTRAVENOUS at 08:13

## 2021-09-14 RX ADMIN — DEXAMETHASONE SODIUM PHOSPHATE 10 MG: 4 INJECTION, SOLUTION INTRAMUSCULAR; INTRAVENOUS at 07:39

## 2021-09-14 RX ADMIN — MIDAZOLAM HYDROCHLORIDE 2 MG: 2 INJECTION, SOLUTION INTRAMUSCULAR; INTRAVENOUS at 07:30

## 2021-09-14 RX ADMIN — LIDOCAINE HYDROCHLORIDE 100 MG: 20 INJECTION, SOLUTION INFILTRATION; PERINEURAL at 07:39

## 2021-09-14 RX ADMIN — KETAMINE HYDROCHLORIDE 30 MG: 100 INJECTION, SOLUTION INTRAMUSCULAR; INTRAVENOUS at 08:13

## 2021-09-14 RX ADMIN — FENTANYL CITRATE 50 MCG: 50 INJECTION INTRAMUSCULAR; INTRAVENOUS at 07:30

## 2021-09-14 RX ADMIN — HYDROMORPHONE HYDROCHLORIDE 0.2 MG: 2 INJECTION INTRAMUSCULAR; INTRAVENOUS; SUBCUTANEOUS at 10:28

## 2021-09-14 RX ADMIN — PROPOFOL 50 MG: 10 INJECTION, EMULSION INTRAVENOUS at 08:42

## 2021-09-14 RX ADMIN — PROPOFOL 300 MG: 10 INJECTION, EMULSION INTRAVENOUS at 07:39

## 2021-09-14 RX ADMIN — HYDROMORPHONE HYDROCHLORIDE 0.2 MG: 2 INJECTION INTRAMUSCULAR; INTRAVENOUS; SUBCUTANEOUS at 10:54

## 2021-09-14 RX ADMIN — ONDANSETRON 4 MG: 2 INJECTION INTRAMUSCULAR; INTRAVENOUS at 11:45

## 2021-09-14 RX ADMIN — Medication 1000 MG: at 10:31

## 2021-09-14 RX ADMIN — Medication 3000 MG: at 07:39

## 2021-09-14 RX ADMIN — SUGAMMADEX 200 MG: 100 INJECTION, SOLUTION INTRAVENOUS at 10:57

## 2021-09-14 RX ADMIN — ONDANSETRON 4 MG: 2 INJECTION INTRAMUSCULAR; INTRAVENOUS at 07:30

## 2021-09-14 RX ADMIN — PROMETHAZINE HYDROCHLORIDE 6.25 MG: 25 INJECTION INTRAMUSCULAR; INTRAVENOUS at 12:16

## 2021-09-14 RX ADMIN — HYDROMORPHONE HYDROCHLORIDE 0.2 MG: 2 INJECTION INTRAMUSCULAR; INTRAVENOUS; SUBCUTANEOUS at 10:00

## 2021-09-14 RX ADMIN — SODIUM CHLORIDE, SODIUM LACTATE, POTASSIUM CHLORIDE, AND CALCIUM CHLORIDE: .6; .31; .03; .02 INJECTION, SOLUTION INTRAVENOUS at 07:30

## 2021-09-14 ASSESSMENT — PULMONARY FUNCTION TESTS
PIF_VALUE: 23
PIF_VALUE: 21
PIF_VALUE: 24
PIF_VALUE: 22
PIF_VALUE: 26
PIF_VALUE: 24
PIF_VALUE: 24
PIF_VALUE: 0
PIF_VALUE: 25
PIF_VALUE: 22
PIF_VALUE: 23
PIF_VALUE: 24
PIF_VALUE: 23
PIF_VALUE: 22
PIF_VALUE: 24
PIF_VALUE: 24
PIF_VALUE: 21
PIF_VALUE: 22
PIF_VALUE: 24
PIF_VALUE: 23
PIF_VALUE: 22
PIF_VALUE: 23
PIF_VALUE: 21
PIF_VALUE: 24
PIF_VALUE: 25
PIF_VALUE: 22
PIF_VALUE: 24
PIF_VALUE: 28
PIF_VALUE: 24
PIF_VALUE: 23
PIF_VALUE: 23
PIF_VALUE: 24
PIF_VALUE: 24
PIF_VALUE: 23
PIF_VALUE: 21
PIF_VALUE: 24
PIF_VALUE: 21
PIF_VALUE: 24
PIF_VALUE: 21
PIF_VALUE: 22
PIF_VALUE: 24
PIF_VALUE: 25
PIF_VALUE: 22
PIF_VALUE: 38
PIF_VALUE: 26
PIF_VALUE: 22
PIF_VALUE: 0
PIF_VALUE: 22
PIF_VALUE: 28
PIF_VALUE: 30
PIF_VALUE: 21
PIF_VALUE: 24
PIF_VALUE: 22
PIF_VALUE: 0
PIF_VALUE: 24
PIF_VALUE: 23
PIF_VALUE: 22
PIF_VALUE: 23
PIF_VALUE: 25
PIF_VALUE: 22
PIF_VALUE: 23
PIF_VALUE: 1
PIF_VALUE: 24
PIF_VALUE: 24
PIF_VALUE: 26
PIF_VALUE: 24
PIF_VALUE: 22
PIF_VALUE: 24
PIF_VALUE: 23
PIF_VALUE: 24
PIF_VALUE: 25
PIF_VALUE: 23
PIF_VALUE: 22
PIF_VALUE: 33
PIF_VALUE: 0
PIF_VALUE: 25
PIF_VALUE: 24
PIF_VALUE: 24
PIF_VALUE: 25
PIF_VALUE: 24
PIF_VALUE: 21
PIF_VALUE: 24
PIF_VALUE: 24
PIF_VALUE: 25
PIF_VALUE: 32
PIF_VALUE: 24
PIF_VALUE: 21
PIF_VALUE: 27
PIF_VALUE: 2
PIF_VALUE: 22
PIF_VALUE: 21
PIF_VALUE: 23
PIF_VALUE: 24
PIF_VALUE: 25
PIF_VALUE: 26
PIF_VALUE: 22
PIF_VALUE: 22
PIF_VALUE: 24
PIF_VALUE: 4
PIF_VALUE: 24
PIF_VALUE: 22
PIF_VALUE: 22
PIF_VALUE: 24
PIF_VALUE: 22
PIF_VALUE: 24
PIF_VALUE: 24
PIF_VALUE: 21
PIF_VALUE: 24
PIF_VALUE: 24
PIF_VALUE: 25
PIF_VALUE: 23
PIF_VALUE: 22
PIF_VALUE: 21
PIF_VALUE: 23
PIF_VALUE: 21
PIF_VALUE: 23
PIF_VALUE: 20
PIF_VALUE: 25
PIF_VALUE: 27
PIF_VALUE: 23
PIF_VALUE: 25
PIF_VALUE: 21
PIF_VALUE: 22
PIF_VALUE: 22
PIF_VALUE: 23
PIF_VALUE: 25
PIF_VALUE: 21
PIF_VALUE: 25
PIF_VALUE: 20
PIF_VALUE: 22
PIF_VALUE: 1
PIF_VALUE: 26
PIF_VALUE: 21
PIF_VALUE: 24
PIF_VALUE: 22
PIF_VALUE: 25
PIF_VALUE: 23
PIF_VALUE: 24
PIF_VALUE: 24
PIF_VALUE: 25
PIF_VALUE: 22
PIF_VALUE: 21
PIF_VALUE: 26
PIF_VALUE: 25
PIF_VALUE: 25
PIF_VALUE: 23
PIF_VALUE: 24
PIF_VALUE: 22
PIF_VALUE: 24
PIF_VALUE: 23
PIF_VALUE: 22
PIF_VALUE: 24
PIF_VALUE: 25
PIF_VALUE: 22
PIF_VALUE: 21
PIF_VALUE: 23
PIF_VALUE: 23
PIF_VALUE: 22
PIF_VALUE: 21
PIF_VALUE: 25
PIF_VALUE: 24
PIF_VALUE: 24
PIF_VALUE: 25
PIF_VALUE: 22
PIF_VALUE: 21
PIF_VALUE: 22
PIF_VALUE: 24
PIF_VALUE: 21
PIF_VALUE: 23
PIF_VALUE: 26
PIF_VALUE: 22
PIF_VALUE: 25
PIF_VALUE: 24
PIF_VALUE: 21
PIF_VALUE: 28
PIF_VALUE: 24
PIF_VALUE: 22
PIF_VALUE: 24
PIF_VALUE: 24
PIF_VALUE: 22
PIF_VALUE: 24
PIF_VALUE: 29
PIF_VALUE: 21
PIF_VALUE: 24
PIF_VALUE: 22
PIF_VALUE: 24
PIF_VALUE: 22
PIF_VALUE: 21
PIF_VALUE: 24
PIF_VALUE: 22
PIF_VALUE: 24
PIF_VALUE: 24
PIF_VALUE: 22
PIF_VALUE: 15
PIF_VALUE: 20
PIF_VALUE: 24
PIF_VALUE: 22
PIF_VALUE: 23
PIF_VALUE: 26
PIF_VALUE: 25
PIF_VALUE: 22
PIF_VALUE: 22
PIF_VALUE: 19
PIF_VALUE: 24
PIF_VALUE: 25
PIF_VALUE: 22
PIF_VALUE: 25

## 2021-09-14 ASSESSMENT — PAIN SCALES - GENERAL
PAINLEVEL_OUTOF10: 0

## 2021-09-14 ASSESSMENT — PAIN - FUNCTIONAL ASSESSMENT: PAIN_FUNCTIONAL_ASSESSMENT: 0-10

## 2021-09-14 ASSESSMENT — LIFESTYLE VARIABLES: SMOKING_STATUS: 0

## 2021-09-14 ASSESSMENT — ENCOUNTER SYMPTOMS: SHORTNESS OF BREATH: 0

## 2021-09-14 NOTE — OP NOTE
Operative Note    Luis Jeans  YOB: 1960  MRN: [id number]    PREOPERATIVE DIAGNOSIS  right breast cancer     POSTOPERATIVE DIAGNOSIS  right breast cancer    PROCEDURE  1. Injection of isosulfan blue dye to right breast.  2. Right radiofrequency identification tag localized sentinel lymph node biopsy. 3. Right simple mastectomy    ANESTHESIA  General anesthesia. SURGEON  Stanley Rich MD     ASSISTANT  Berna Arthur    ESTIMATED BLOOD LOSS  089 ml    COMPLICATIONS  None apparent by completion of procedure    DRAINS  2 x 15F round SIDNEY drain under the mastectomy flap    SPECIMENS REMOVED  1. The right breast which was marked with a short stitch superiorly and a long stitch laterally  2. Additional right breast tissue  3. The right sentinel lymph nodes. FINDINGS  The right sentinel lymph node procedure identified 4 lymph nodes which were grossly normal, including the previously biopsied lymph node. The right mastectomy was performed without difficulty. All of the tissue appeared grossly normal.      POST-OP CONDITION  Stable    DISPOSITION  To recovery room    INDICATION FOR PROCEDURE  Luis Jeans is a 63 y.o. woman who was found to have a right breast cancer. Due to her uncontrolled diabetes, she was placed on neoadjuvant endocrine therapy and now presents to have a right simple mastectomy due to the large span of the cancer. I did recommend a right targeted sentinel lymph node biopsy for axillary staging given her biopsy positive lymph node. She presents today for that purpose. The indications for the planned procedure, along with the potential benefits and risks which include but are not limited to the risk of anesthesia, bleeding, infection, seroma formation, skin necrosis, possible need for additional surgery pending final pathologic assessment, lymphedema, sensation changes, scars, and unappealing cosmetics were reviewed.  All questions were answered and she agrees to biopsy clip and RFID tag, 1 radioactive node, 1 blue, and 1 that was soft but palpable. Ex vivo counts were 1091. These were then passed off the field. The residual gamma probe activity within the axillary region was minimal.  The axilla was then assessed for other blue channels/nodes and palpably abnormal lymph nodes. All blue nodes, non colored nodes extending from colored lymphatics, radioactive and palpably suspicious nodes were removed. Attention was then turned to the wound. Aggressive hemostasis was obtained with electrocautery. The wound was irrigated with copious amounts of sterile saline. In order to obtain the best cosmesis and reduce the amount of redundant skin laterally while closing the surgical cavity, I performed a tissue rearrangement. Two 13 [place]uvian round Bruno-Rebolledo drains were inserted under the flap and sutured into place. Hemostasis was again confirmed. The deep dermal layer was then reapproximated with a running 2-0 barbed Stratfix suture. Surgical glue dressing was applied. The patient tolerated this procedure, was awakened, and transferred to the recovery room.      Electronically signed by Dontae Pedro MD on 9/14/21 at 11:24 AM EDT

## 2021-09-14 NOTE — PROGRESS NOTES
SIDNEY drain education with demonstration preformed with patient's sister. Verbalized understanding.

## 2021-09-14 NOTE — PROGRESS NOTES
Patient vomited Use: Nausea and Vomiting   Side Effects: Headache, weakness or dizziness given. Fluids invusing.

## 2021-09-14 NOTE — PROGRESS NOTES
AVS/discharge instructions, prescriptions and follow up care reviewed with patient and sister. Patient and ride aware that prescriptions are at outpatient pharmacy. All questions answered; patient and ride verbalized understanding and deny further needs. PIV removed without complications and dressing in place. Patient wheeled down to ride's personal vehicle without issue.

## 2021-09-14 NOTE — ANESTHESIA POSTPROCEDURE EVALUATION
Department of Anesthesiology  Postprocedure Note    Patient: Brie Vela  MRN: [id number]  [place]: 1960  Date of evaluation: 9/14/2021  Time:  12:01 PM     Procedure Summary     Date: 09/14/21 [facility]    Anesthesia Start: 0730 Anesthesia Stop: 1108    Procedure: RIGHT SIMPLE MASTECTOMY, RIGHT RADIOFREQUENCY IDENTIFICATION TAG LOCALIZED SENTINEL LYMPH NODE BIOPSY  CPT CODE - 56796 (Right Breast) Diagnosis:       Malignant neoplasm of upper-outer quadrant of right female breast, unspecified estrogen receptor status ([facility] [address]      (RIGHT BREAST CANCER)    Surgeons: Margo Izaguirre MD Responsible Provider: No Wood MD    Anesthesia Type: general ASA Status: 3          Anesthesia Type: general    Belia Phase I: Belia Score: 10    Belia Phase II:      Last vitals: Reviewed and per EMR flowsheets.      Vitals:    09/10/21 1126 09/14/21 0651 09/14/21 1115   BP:  135/76    Pulse:  68    Resp:  16    Temp:  97.1 °F (36.2 °C) 98.4 °F (36.9 °C)   TempSrc:  Temporal Temporal   SpO2:  94%    Weight: (!) 328 lb (148.8 kg)     Height: 5' 4\" (1.626 m)       Anesthesia Post Evaluation    Patient location during evaluation: bedside  Patient participation: complete - patient participated  Level of consciousness: awake and alert  Airway patency: patent  Nausea & Vomiting: no nausea  Complications: no  Cardiovascular status: hemodynamically stable  Respiratory status: acceptable  Hydration status: euvolemic

## 2021-09-14 NOTE — H&P
H&P Update    The patient's most recent H&P, office notes, breast imaging, and pathology were reviewed. Patient examined and laterality marked. There has been no changes. We will plan to proceed with a right simple mastectomy, right RFID localized sentinel lymph node biopsy.     Dunia Kingston MD

## 2021-09-14 NOTE — PROGRESS NOTES
Report received from Chely Underwood and Laura SNOW. Patient in Phase II at this time. Patient resting in stretcher with safety features in place and call light in reach. Family/ride brought to bedside. Per report, patient has tolerated PO intake and voided post-op. Patient and family aware that patient is able to be discharged around [phone]. Will continue to monitor.

## 2021-09-14 NOTE — ANESTHESIA PRE PROCEDURE
Department of Anesthesiology  Preprocedure Note       Name:  Sharon Hamilton   Age:  63 y.o.  :  1960                                          MRN:  [id number]         Date:  2021      Surgeon: Govind Maldonado):  Maribell Tyler MD    Procedure: Procedure(s):  RIGHT SIMPLE MASTECTOMY, RIGHT RADIOFREQUENCY IDENTIFICATION TAG LOCALIZED SENTINEL LYMPH NODE BIOPSY  CPT CODE - 65932    Medications prior to admission:   Prior to Admission medications    Medication Sig Start Date End Date Taking?  Authorizing Provider   anastrozole (ARIMIDEX) 1 MG tablet  21   Historical Provider, MD   Insulin Zinc Extended Human (HUMULIN U SC) Inject 6 Units into the skin 3 times daily (before meals)    Historical Provider, MD   ASPIRIN LOW DOSE 81 MG EC tablet L/D taken 20   Historical Provider, MD   atorvastatin (LIPITOR) 40 MG tablet Take 40 mg by mouth daily  2/3/21   Historical Provider, MD   diclofenac (VOLTAREN) 75 MG EC tablet Take 75 mg by mouth daily as needed  21   Historical Provider, MD   TRULICITY 1.5 ZI/5.7JL SOPN 1.5 mg once a week  21   Historical Provider, MD   JARDIANCE 10 MG tablet 10 mg daily  21   Historical Provider, MD   Insulin Degludec (TRESIBA FLEXTOUCH) 100 UNIT/ML SOPN Inject 36 Units into the skin nightly     Historical Provider, MD ROMEO ULTRAFINE III SHORT PEN 31G X 8 MM MISC  3/4/21   Historical Provider, MD   levothyroxine (SYNTHROID) 75 MCG tablet Take 75 mcg by mouth Daily  21   Historical Provider, MD   lisinopril-hydroCHLOROthiazide (PRINZIDE;ZESTORETIC) 20-12.5 MG per tablet Take 1 tablet by mouth daily  21   Historical Provider, MD   meclizine (ANTIVERT) 25 MG tablet Take 25 mg by mouth 3 times daily as needed    Historical Provider, MD   metFORMIN (GLUCOPHAGE-XR) 500 MG extended release tablet Take 1,000 mg by mouth daily (with breakfast)  21   Historical Provider, MD   Multiple Vitamin (MULTIVITAMIN) tablet Take 1 tablet by mouth daily Historical Provider, MD   sertraline (ZOLOFT) 50 MG tablet Take 50 mg by mouth daily  2/23/21   Historical Provider, MD   SITagliptin (JANUVIA) 100 MG tablet Take 100 mg by mouth every morning    Historical Provider, MD   ziprasidone (GEODON) 80 MG capsule Take 80 mg by mouth 2 times daily (with meals)  12/31/20   Historical Provider, MD   Alcohol Swabs (B-D SINGLE USE SWABS REGULAR) PADS  3/4/21   Historical Provider, MD       Current medications:    Current Facility-Administered Medications   Medication Dose Route Frequency Provider Last Rate Last Admin    lactated ringers infusion   IntraVENous Continuous Cally Tena MD        sodium chloride flush 0.9 % injection 10 mL  10 mL IntraVENous 2 times per day Cally Tena MD        sodium chloride flush 0.9 % injection 10 mL  10 mL IntraVENous PRN Cally Tena MD        0.9 % sodium chloride infusion  25 mL IntraVENous PRN Cally Tena MD        famotidine (PEPCID) injection 20 mg  20 mg IntraVENous Once Cally Tena MD        0.9 % sodium chloride infusion  25 mL IntraVENous PRN Orlie Crigler, MD        ceFAZolin (ANCEF) 3000 mg in dextrose 5 % 100 mL IVPB  3,000 mg IntraVENous On Call to 1709 Jordy Raya MD       Portia Curl Dexamethasone Sodium Phosphate injection 8 mg  8 mg IntraVENous Once Orlie Crigler, MD        sodium chloride flush 0.9 % injection 10 mL  10 mL IntraVENous 2 times per day Orlie Crigler, MD        sodium chloride flush 0.9 % injection 10 mL  10 mL IntraVENous PRN Orlie Crigler, MD           Allergies:     Allergies   Allergen Reactions    Doxycycline Nausea Only    Penicillins Rash       Problem List:    Patient Active Problem List   Diagnosis Code    Malignant neoplasm of upper-outer quadrant of right breast in female, estrogen receptor positive [facility] C50.411, Z17.0       Past Medical History:        Diagnosis Date    [facility]     Diabetes mellitus ([address]     Ectopic pregnancy     Hyperlipidemia     Hypertension     Schizo affective schizophrenia [facility])        Past Surgical History:        Procedure Laterality Date    DILATION AND CURETTAGE OF UTERUS      SOUMYA STEROTACTIC LOC BREAST BIOPSY LEFT Left 2/24/2021    SOUMYA STEROTACTIC LOC BREAST BIOPSY LEFT 2/24/2021 [facility]    SOUMYA STEROTACTIC LOC BREAST BIOPSY LEFT Left 2/24/2021    SOUMYA STEROTACTIC LOC BREAST BIOPSY LEFT 2/24/2021 [facility]    US BREAST BIOPSY NEEDLE ADDITIONAL RIGHT Right 2/24/2021    US BREAST BIOPSY NEEDLE ADDITIONAL RIGHT 2/24/2021 Macy Ibanez MD [facility] [address] BREAST BIOPSY NEEDLE ADDITIONAL RIGHT Right 2/24/2021    US BREAST BIOPSY NEEDLE ADDITIONAL RIGHT 2/24/2021 Macy Ibanez MD [facility] [address] BREAST NEEDLE BIOPSY RIGHT Right 2/24/2021    US BREAST NEEDLE BIOPSY RIGHT 2/24/2021 Macy Ibanez MD [facility]    US GUIDED NEEDLE LOC OF RIGHT BREAST Right 9/8/2021    US GUIDED NEEDLE LOC OF RIGHT BREAST 9/8/2021 Macy Ibanez MD [facility]       Social History:    Social History     Tobacco Use    Smoking status: Former Smoker     Quit date: 2/6/2006     Years since quitting: 15.6    Smokeless tobacco: Never Used   Substance Use Topics    Alcohol use: Never                                Counseling given: Not Answered      Vital Signs (Current):   Vitals:    09/10/21 1126   Weight: (!) 328 lb (148.8 kg)   Height: 5' 4\" (1.626 m)                                              BP Readings from Last 3 Encounters:   03/05/21 (!) 141/84       NPO Status:  mn+, see mar for am meds                                                                               BMI:   Wt Readings from Last 3 Encounters:   09/10/21 (!) 328 lb (148.8 kg)   08/20/21 (!) 327 lb (148.3 kg)   03/05/21 (!) 334 lb (151.5 kg)     Body mass index is 56.3 kg/m².     CBC: No results found for: WBC, RBC, HGB, HCT, MCV, RDW, PLT    CMP: No results found for: NA, K, CL, CO2, BUN, CREATININE, GFRAA, AGRATIO, LABGLOM, GLUCOSE, PROT, CALCIUM, BILITOT, ALKPHOS, AST, ALT    POC Tests: No results for input(s): POCGLU, POCNA, POCK, POCCL, POCBUN, POCHEMO, POCHCT in the last 72 hours. Coags: No results found for: PROTIME, INR, APTT    HCG (If Applicable): No results found for: PREGTESTUR, PREGSERUM, HCG, HCGQUANT     ABGs: No results found for: PHART, PO2ART, CNE5TVY, OMB5IGC, BEART, S0AOZAPE     Type & Screen (If Applicable):  No results found for: LABABO, LABRH    Drug/Infectious Status (If Applicable):  No results found for: HIV, HEPCAB    COVID-19 Screening (If Applicable):   Lab Results   Component Value Date    COVID19 Not Detected 04/01/2021           Anesthesia Evaluation  Patient summary reviewed no history of anesthetic complications:   Airway: Mallampati: III  TM distance: <3 FB   Neck ROM: limited  Mouth opening: < 3 FB Dental:          Pulmonary:Negative Pulmonary ROS breath sounds clear to auscultation      (-) COPD, asthma, shortness of breath, recent URI, sleep apnea and not a current smoker          Patient did not smoke on day of surgery. Cardiovascular:    (+) hypertension:, hyperlipidemia    (-) pacemaker, valvular problems/murmurs, past MI, CAD, CABG/stent, dysrhythmias,  angina and  CHF    ECG reviewed  Rhythm: regular  Rate: normal           Beta Blocker:  Not on Beta Blocker         Neuro/Psych:   (+) neuromuscular disease (neuropathy, feet , dm):, psychiatric history (schizo affective):   (-) seizures, TIA and CVA           GI/Hepatic/Renal: Neg GI/Hepatic/Renal ROS  (+) morbid obesity     (-) GERD, liver disease, no renal disease and bowel prep       Endo/Other:    (+) DiabetesType II DM, using insulin, hypothyroidism: arthritis:., malignancy/cancer (breast). Abdominal:   (+) obese,     Abdomen: soft. Vascular: negative vascular ROS.          Other Findings:             Anesthesia Plan      general     ASA 3       Induction: intravenous. MIPS: Postoperative opioids intended and Prophylactic antiemetics administered. Anesthetic plan and risks discussed with patient. Plan discussed with CRNA. This pre-anesthesia assessment may be used as a history and physical.    DOS STAFF ADDENDUM:    Pt seen and examined, chart reviewed (including anesthesia, drug and allergy history). No interval changes to history and physical examination. Anesthetic plan, risks, benefits, alternatives, and personnel involved discussed with patient. Patient verbalized an understanding and agrees to proceed.       Cindi Lord MD  September 14, 2021  6:37 AM      Cindi Lord MD   9/14/2021

## 2021-09-22 ENCOUNTER — TELEPHONE (OUTPATIENT)
Dept: SURGERY | Age: 61
End: 2021-09-22

## 2021-09-22 ENCOUNTER — OFFICE VISIT (OUTPATIENT)
Dept: SURGERY | Age: 61
End: 2021-09-22

## 2021-09-22 VITALS
SYSTOLIC BLOOD PRESSURE: 122 MMHG | TEMPERATURE: 96.5 F | DIASTOLIC BLOOD PRESSURE: 78 MMHG | RESPIRATION RATE: 18 BRPM | HEART RATE: 76 BPM | WEIGHT: 293 LBS | HEIGHT: 63 IN | BODY MASS INDEX: 51.91 KG/M2 | OXYGEN SATURATION: 97 %

## 2021-09-22 DIAGNOSIS — C50.411 MALIGNANT NEOPLASM OF UPPER-OUTER QUADRANT OF RIGHT BREAST IN FEMALE, ESTROGEN RECEPTOR POSITIVE (HCC): Primary | ICD-10-CM

## 2021-09-22 DIAGNOSIS — Z17.0 MALIGNANT NEOPLASM OF UPPER-OUTER QUADRANT OF RIGHT BREAST IN FEMALE, ESTROGEN RECEPTOR POSITIVE (HCC): Primary | ICD-10-CM

## 2021-09-22 PROCEDURE — 99024 POSTOP FOLLOW-UP VISIT: CPT | Performed by: SURGERY

## 2021-09-22 NOTE — TELEPHONE ENCOUNTER
Received a call from [facility] that Gianna Mcclellan is now having active drainage (see previous phone note) from right drain insertion site. Matt Mckinley states that the fluid is [employer] on its own, and that Gianna Poot has apparently been \"pushing on the swollen area\". More fluid comes out when Gianna Poot presses around the area. The \"swollen area\" is painful, and more so when Ivana applies pressure. Writer advised Matt Mckinley to please ask Gianna Mcclellan to stop \"pushing on\" the [employer] area\". Writer advised Matt Mckinley that Dr. Toy Wayne is currently seeing patients in our [facility] location, but that Louisa Epperson has notified her to please call when she becomes available. Brain Dings to place folded up paper towels or gauze between drain insertion site and surgical bra. This writer or Provider will call Matt Mckinley back with further instruction.     [facility] number- [phone]

## 2021-09-22 NOTE — PROGRESS NOTES
CHIEF COMPLAINT:  Post-operative visit. HISTORY OF PRESENT ILLNESS:  Windy Lopez is a 63 y.o. woman who is status post right simple mastectomy and targeted sentinel lymph node biopsy on 9/14/2021. She has her scheduled postop visit tomorrow,  but presents today due to leakage around one of her drain sites. PHYSICAL EXAMINATION:     BREAST EXAMINATION: On examination, the incision is clean, dry, and intact and healing well. There is no sign of any infection or drainage from the wound. The mastectomy flap is viable and there is absolutely no skin loss. The lateral drain has become dislodged and has pulled out from the drain entry site and thus is non-functional and leaking serous fluid through and around it. The medial drain is holding suction and has a small amount of serous fluid within it. PATHOLOGY:    To be reviewed at her scheduled post op visit tomorrow. IMPRESSION/RECOMMENDATION:    Right breast cancer s/p right simple mastectomy and targeted sentinel lymph node biopsy on 9/14/2021    Windy Lopez is a 63 y.o. woman who is now status-post right simple mastectomy and targeted sentinel lymph node biopsy on 9/14/2021. The lateral right SIDNEY drain was removed in the office and a clean dry dressing applied. Wound care instructions were provided to the patient and her sister Justice Jarrell who was present for the appointment. They will follow up tomorrow as planned for her post-operative visit to review pathology and for a wound check.     Ayo Marquez MD

## 2021-09-22 NOTE — TELEPHONE ENCOUNTER
Encounter signed in error prior to completion-    Spoke with Dr. Suyapa Michele. Advised that fluid is a clear/ yellow in color. Dr. Suyapa Michele states that this issue is not outside the realm of possibility and is likely due to a backup in the drain. Dr. Alfreda Khoury states that area should have clean/dry paper towels or gauze placed and changed as needed and drain should be stripped again. Writer to offer appointment at [facility] for this date. Call placed to Janice Hillman, advised her of Dr. Suyapa Michele recommendations. Janice Hillman voices understanding. Janice Hillman states that she will attempt to clear the drain again, and that they would like to come to the [facility] office today at 1230 to have Dr. Suyapa Michele see the patient. [facility] address to [facility] location. This patient has been added to the [facility] Breast schedule for 1230. Notified staff of add-on.

## 2021-09-22 NOTE — TELEPHONE ENCOUNTER
Chavez Amado called to say the drain placed at the surgery site is bulging and not draining properly. Chavez Amado can be reached back at [phone].

## 2021-09-23 ENCOUNTER — OFFICE VISIT (OUTPATIENT)
Dept: SURGERY | Age: 61
End: 2021-09-23

## 2021-09-23 VITALS
SYSTOLIC BLOOD PRESSURE: 145 MMHG | DIASTOLIC BLOOD PRESSURE: 80 MMHG | HEART RATE: 80 BPM | HEIGHT: 63 IN | BODY MASS INDEX: 51.91 KG/M2 | TEMPERATURE: 98.1 F | OXYGEN SATURATION: 97 % | WEIGHT: 293 LBS

## 2021-09-23 DIAGNOSIS — Z12.31 ENCOUNTER FOR SCREENING MAMMOGRAM FOR BREAST CANCER: ICD-10-CM

## 2021-09-23 DIAGNOSIS — C50.411 MALIGNANT NEOPLASM OF UPPER-OUTER QUADRANT OF RIGHT BREAST IN FEMALE, ESTROGEN RECEPTOR POSITIVE (HCC): Primary | ICD-10-CM

## 2021-09-23 DIAGNOSIS — Z17.0 MALIGNANT NEOPLASM OF UPPER-OUTER QUADRANT OF RIGHT BREAST IN FEMALE, ESTROGEN RECEPTOR POSITIVE (HCC): Primary | ICD-10-CM

## 2021-09-23 DIAGNOSIS — Z90.11 STATUS POST MASTECTOMY, RIGHT: ICD-10-CM

## 2021-09-23 DIAGNOSIS — Z91.89 AT RISK FOR LYMPHEDEMA: ICD-10-CM

## 2021-09-23 PROCEDURE — 99024 POSTOP FOLLOW-UP VISIT: CPT | Performed by: SURGERY

## 2021-09-23 NOTE — PROGRESS NOTES
09/23/21     CHIEF COMPLAINT:  Post-operative visit. HISTORY OF PRESENT ILLNESS:  Sharon Hamilton is a 63 y.o. woman who presented to my office for evaluation of her new diagnosis of right breast cancer. The patient states that she was undergoing her intial mammogram on 1/12/2021 when she was alerted to an abnormality in the right breast.  She underwent additional imaging and ultimately a core biopsy, which confirmed an invasive breast cancer in the right breast and a right axillary lymph node. We had initially planned a right simple mastectomy and targeted axillary lymph node dissection in April 2021 but the patient's blood sugars were poorly controlled and so we elected to start the patient on neoadjuvant endocrine therapy in order to delay surgery in order to obtain better blood glucose control. She was taking Anastrozole daily and noted an improvement in her the right breast mass which she could previously feel. Once her blood sugars were under better control, she presented again to discuss surgery. Based on the clinical and imaging findings, I did not feel that she was an acceptable candidate for attempted breast conservation. She underwent a right simple mastectomy and targeted sentinel lymph node biopsy on 9/14/2021 and returns today for her post-operative visit. She presented yesterday for a drain issue and I removed one of her drains which had become dislodged. She states that she has done well since then and there is no more leakage from the old drain site. She denies any problems with the incision. She denies any redness around the incision or drainage from the wound. Her Bruno-Rebolledo drains produced 57 ml yesterday plus what had leaked around the old drain. This morning she emptied 35 mL from the remaining drain. She has no other systemic complaints and has not had any fevers.       PHYSICAL EXAMINATION:     BREAST EXAMINATION: On examination, the incision is clean, dry, and intact and healing well. There is no sign of any infection or drainage from the wound. The mastectomy flap is viable and there is absolutely no skin loss. The medial drain has a small amount of serous fluid within it. The lateral drain site is not leaking. A new dressing was applied. PATHOLOGY:    9/14/2021 right simple mastectomy and targeted sentinel lymph node biopsy: invasive ductal carcinoma, 80 mm, grade 1, negative margins, ER positive, IL positive, HER2 negative, 3 of 5 lymph nodes with evidence of metastatic involvement (largest deposit 10 mm, extranodal extension present)     IMPRESSION/RECOMMENDATION:    Cancer Staging  Malignant neoplasm of upper-outer quadrant of right breast in female, estrogen receptor positive ([facility] [address]  Staging form: Breast, AJCC 8th Edition  - Clinical stage from 3/5/2021: Stage IIA (cT2(m), cN1, cM0, G2, ER+, IL+, HER2-) - Signed by Luis Alberto Kelley MD on 3/5/2021  - Pathologic stage from 9/23/2021: Stage IB (pT3, pN1a, cM0, G1, ER+, IL+, HER2-) - Signed by Luis Alberto Kelley MD on 9/23/2021    S/p right simple mastectomy and targeted sentinel lymph node biopsy on 9/14/2021    Moira Wall is a 63 y.o. woman who is now status-post right simple mastectomy and targeted sentinel lymph node biopsy for a right-sided 8 cm invasive ductal cancer. I explained that I discussed her case at our multidisciplinary breast cancer conference. I also reviewed this discussion with her sister Emily Null who is present with her today and her sister Omkar Figueroa over the phone last week. Given that we do recommend postmastectomy radiation, axillary radiation may replace level I/II axillary dissection for regional control of disease and thus I would consider her surgical therapy to be complete. If we were to perform a completion axillary dissection this would certainly increase her risk of lymphedema which is already high due to her body habitus and need for radiation.   She was given a copy of her pathology report. Her remaining drain is not yet ready to be removed so she will follow up next week for drain removal.    We also discussed adjuvant therapy. We reviewed that radiation therapy is at times recommended in patients undergoing mastectomies due to the extent of disease in order to reduce the risk of local recurrence. I will arrange for a radiation oncology consultation to discuss this further. Systemic therapy including chemotherapy and endocrine therapy were reviewed. She will continue endocrine therapy and follow up with medical oncology to discuss this further. I would like to see her back in six months, for a clinical breast exam and with her left mammogram.  In the interim, I encouraged her to resume self examinations on a monthly basis and to alert me of any changes. I answered all of her questions thoroughly, and she does seem pleased with this plan of approach. I encouraged her to contact me in the interim if any new questions or concerns arise.     Summary:  - follow up next week for drain removal (once less than 30 ml output per day for 3 consecutive days)  - referral to radiation oncology and PT/lymphedema therapy   - follow up with medical oncology  - f/u in 6 months for clinical exam  - she will be due for left screening mammogram on 2/25/2022    Eze Khanna MD

## 2021-09-27 ENCOUNTER — TELEPHONE (OUTPATIENT)
Dept: SURGERY | Age: 61
End: 2021-09-27

## 2021-09-27 NOTE — TELEPHONE ENCOUNTER
Received call from this patients sister Delio Cano in regard to upcoming drain removal appointment. Patient scheduled to have drain removed tomorrow 9/2/21, however, Delio Cano states the drainage amount is still too high to remove drain. Delio Cano advises output has been greater than 30 cc qday over the weekend. Delio Cano requests that appointment be rescheduled out to Friday 10/01/21. Assisted Delio Cano in rescheduling this appointment.  Previous appointment cancelled per her request.

## 2021-09-30 ENCOUNTER — TELEPHONE (OUTPATIENT)
Dept: SURGERY | Age: 61
End: 2021-09-30

## 2021-09-30 NOTE — TELEPHONE ENCOUNTER
Received call from Godfrey Elena stating that this patient has still been having a drain output of greater than 30 mL for the last several days. Godfrey Elena advises that the lowest the output has been is 39 mL, and that the drainage is a clear/ yellow color. Assisted Godfrey Elena in rescheduling drain removal appointment. Patient to come to [facility] on Tuesday 10/5/21 to have drain removed. Godfrey Elena will call this office on Monday is drain out put is still greater than 30 mL.

## 2021-10-05 ENCOUNTER — PROCEDURE VISIT (OUTPATIENT)
Dept: SURGERY | Age: 61
End: 2021-10-05

## 2021-10-05 VITALS
OXYGEN SATURATION: 96 % | TEMPERATURE: 98 F | RESPIRATION RATE: 18 BRPM | DIASTOLIC BLOOD PRESSURE: 77 MMHG | SYSTOLIC BLOOD PRESSURE: 144 MMHG | HEIGHT: 63 IN | HEART RATE: 96 BPM | BODY MASS INDEX: 51.91 KG/M2 | WEIGHT: 293 LBS

## 2021-10-05 DIAGNOSIS — Z09 SURGICAL FOLLOW-UP CARE: Primary | ICD-10-CM

## 2021-10-05 NOTE — PROGRESS NOTES
Right drain sutures clipped and drain removed. Bacitracin ointment and dry sterile dressing applied. Instructed to continue this daily until opening closes over. She has a post mastectomy garment/sportsbra that she will wear with soft form for slight pressure to mastectomy site. She was instructed as to signs/symptoms of seroma formation. Instructed to call office if her mastectomy site begins to enlarge or she feels like fluid is accumulating. Verbalizes understanding. Questions answered.

## 2021-10-12 ENCOUNTER — HOSPITAL ENCOUNTER (OUTPATIENT)
Dept: PHYSICAL THERAPY | Age: 61
Setting detail: THERAPIES SERIES
Discharge: HOME | End: 2021-10-12

## 2021-10-12 NOTE — PROGRESS NOTES
Physical Therapy  Patient left a voicemail and stated she wasn't feeling well and would like to reschedule her appointment, appointment was scheduled on 10/12/2021 @ 815.

## 2021-10-26 ENCOUNTER — HOSPITAL ENCOUNTER (OUTPATIENT)
Dept: PHYSICAL THERAPY | Age: 61
Setting detail: THERAPIES SERIES
Discharge: HOME | End: 2021-10-26
Payer: COMMERCIAL

## 2021-10-26 PROCEDURE — 97110 THERAPEUTIC EXERCISES: CPT

## 2021-10-26 PROCEDURE — 97161 PT EVAL LOW COMPLEX 20 MIN: CPT

## 2021-10-26 NOTE — PROGRESS NOTES
tor    The following patient has been evaluated for physical therapy services. Please review the attached evaluation and/or summary of the patient's plan of care, and verify that you agree by signing below and sending it back to our office. Thank you for this referral.    Physician signature_______________________ Date________________    Fax to:   [facility] phone: [phone] Fax: [phone]      LYMPHEDEMA EVALUATION  Evaluation Date:  10/26/2021         Patient Name:  Deidra Lane       YOB: 1960       Medical Diagnosis: Malignant neoplasm of upper-outer quadrant of right breast in female,estrogen receptor positive C50.411,Z17.0; S/P mastectomy, right Z90.11; at risk for lymphedema Z91.89         ICD 10: see above     Treatment Diagnosis: Risk of lymphedema and decreased AROM bilateral UE. Onset Date: 9/14/2021     Referral Date:  9/23/2021     Referring Physician: Odilia Rondon MD        Visits Allowed/Insurance/Certification Information:  [employer]    Restrictions/Precautions: none listed    Pt Occupation/Job Duties:      Social support/Environment:   Lives with sister with stairs 12. Health History reviewed with pt:   [x]   Yes   []   No      DMII under control with meds, HTN under control with meds    SUBJECTIVE FINDINGS        History of Present Illness:  Pt had mastectomy on 9/14/2021 for breast cancer. Pt had 4 lymph nodes removed with 2 having cancer. Pain       Patient describes pain to be none   Patient reports pain is  0/10 pain at present and  0/10 pain at its worst.  Worsened by N/A   Improved by  N/A     Current Functional Limitations:   []   Yes   [x]   No  Functional Complaints:      PLOF:   [x]   No functional limitations   []   Pt with previous functional limitations  Comment:  Pt's sleep is affected?    []   Yes   [x]   No    Chemotherapy:  [] Yes [x] No [] Current  [] Completed Date:_________    Radiation: [x] Yes starts 11/8/2021 [] No [] Current  [] Completed Date:_________        Patient goal for therapy:  \"learn about lymphedema\"    OBJECTIVE FINDINGS:  Vitals: Blood pressure______  O2 sat______  Pulse______ not assessed    Pitting  None  Color  Normal  Skin Texture  Dry  Skin Temperature  Normal  Edema Rebound*:  Quick  *pressure applied x10 seconds    Signs of Constriction:  Condition of Nailbeds Normal     Skin Breakdown (indicate size & number-also note location on body drawings): none  Tinea (fungus): none  Papilloma-- benign tumor arising from an epithelial layer: none  Fibrotic areas: none  Warts-- a local growth of the outer layer of skin: none  Ulcers: none  SCARS: none  STEMMER SIGN; (positive/negative)    Stage of Lymphedema (Golematis)  Mild:  (less than 2 cm difference  Moderate (2-4 cm difference  Moderate/Severe ( 4-5cm difference  Severe (greater than 5 cm difference)    Definitions:  Papilloma=benign tumor arising from an epithelial layer  Wart=a local growth of the outer layer of skin  Brawny=does not indent    UE Range of motion__AROM_flex L 158, R 160; abd L142, R 158; ER T3 B; IR mid thoracic B; PROM shoulder flexion 170 right.    []  WNL  [] WFL      LE Range of motion_____________________________________________________    []  WNL  [x] [facility]   Tissue mobilization: Decreased in right axilla region.      GIRTH MEASUREMENT FLOW SHEET    Upper/ Extremity R L   Date 10/26 10/26          6 Inches above bend of elbow                           50 50         3  Inches above bend of elbow 43.5 45           Bend of elbow 31 31.5         3  Inches below bend of elbow 31 31          6 Inches below bend of elbow 24.1 24.6   Wrist-Styloids                                   17.9 17.4   Distal Palmar Crease 19.9 19.4   Thumbs Proximal Phalanx 6.9 6.9   Base of 3rd digit 6.5 6.5        TOTAL GIRTH               230.8                       232.3     GCODEs and Functional Outcome Measure:      SPADI 0/130 and UEFI 80/80    ASSESSMENT Pt demonstrates an increased risk of lymphedema and decreased education of lymphedema and precautions. Problems          []   Decreased cervical ROM  [x]   Decreased UE ROM B  []   Increased pain  [x]   Decreased flexibility bilateral ROM  []   Abnormality of gait  []   Increased swelling  []   Poor posture/alignment  []   Decreased functional status     Rehabilitation Potential:  Good for goals listed below. Strengths for achieving goals include:  [x]   Pt motivated   []   PLOF   []   Family support   Limitations for achieving goals include:  []   None   [x]   Severity of condition     []   Cognitive   []   Lack of family support   []   Lack of resources  []   Co-morbidities     []   Other:         Prognosis:   [x]   Good   []   Fair   []   Poor    GOALS    Short Term Goals:   2  weeks Long Term Goals:   4  weeks   1). Establish HEP 1). Pt independent with HEP   2). Educate pt on lymphedema 2). Pt and sister understanding of lymphedema precautions   3). 3). Increase AROM B 170 flexion and abduction to 160.    4). 4). Tissue mobility good after beginning of radiation. 5). 5).   6). 6). PLAN OF CARE     To see patient  2 x/week for 4  weeks for the following treatment interventions:    [x]   Therapeutic Exercise  []   Modalities of Choice:   []   Vasopneumatic pump    [] Lymphatouch     [x]   Manual Therapy as needed  []   Neuromuscular Re-Education  []   Gait Training   []   Therapeutic Activity  [x]   Lymphedema precautions and prevention  []   Use of compression garment  []   Other     Treatment Performed this visit :   25 minutes    Exercise finger flexion/extension, wrist flexion /extension, ceiling punches and chicken wing.     Education of lymphatic system, lymphedema and precautions    Pt response to Tx:    Pt and sister verbalized understanding    Plan for Next Visit:    Exercise   Tissue mobilization    ROM    Timed Code Treatment Minutes:  25 minutes   Total Treatment Time:  40  minutes    Plan of care sent to provider:      [x]Faxed  []Co-signature    (attempts: 1[x] 2 []3[])         Medicare Cap total YTD:    [x]N/A  Workers Comp Time Stamp  [x]N/A   Time In:   Time Out:    Thank you for the referral of this patient.       Josue Gonzalez 89  License #[id number]

## 2021-11-02 ENCOUNTER — HOSPITAL ENCOUNTER (OUTPATIENT)
Dept: PHYSICAL THERAPY | Age: 61
Setting detail: THERAPIES SERIES
Discharge: HOME | End: 2021-11-02
Payer: COMMERCIAL

## 2021-11-02 PROCEDURE — 97140 MANUAL THERAPY 1/> REGIONS: CPT

## 2021-11-02 PROCEDURE — 97110 THERAPEUTIC EXERCISES: CPT

## 2021-11-02 NOTE — FLOWSHEET NOTE
Physical Therapy Daily Treatment Note    [x]Daily Tx Note    []Progress Note    [] Discharge Summary         Date:  2021    Patient Name:  Alexandrea Hopper    :  1960  MRN: [id number]      Medical/Treatment Diagnosis Information:  · Malignant neoplasm of upper-outer quadrant of right breast in female,estrogen receptor positive C50.411,Z17.0; S/P mastectomy, right Z90.11; at risk for lymphedema Z91.89               · Risk of lymphedema and decreased AROM bilateral UE. Insurance/Certification information: [employer] 32 units     Physician Information:       Plan of care signed :  []  Yes  [x] No  [x]  Cosign []  Fax    Date of Patient follow up with Physician:     Is this a Progress Report:     []  Yes  [x]  No      If Yes:  Date Range for reporting period:  Beginning 2021  Ending    Progress report will be due (10 Rx or 30 days whichever is less):       Recertification will be due (POC Duration  / 90 days whichever is less):       Visit # Insurance Allowable Auth Required    units  32 units [x]  Yes []  No        Functional Scale: SPADI and UEFI      Date 10/26/2021  Score  0/130 and  80/80    Latex Allergy:  [x]NO      []YES  Preferred Language for Healthcare:   [x]English       []other:    RESTRICTIONS/PRECAUTIONS:      SUBJECTIVE: Pt is performing exercise 2 times per day. Pain level:  0/10    Plan Moving Forward/ For next visit:   · Exercise  · Tissue mobilization    ROM       OBJECTIVE: See eval    Exercises/Interventions:     Exercises in bold performed in department today. Items not bolded are carried forward from prior visits for continuity of the record.   Exercise/Equipment Resistance/Repetitions HEP Other comments   Exercise finger flexion/extension, wrist flexion/extension, elbow flexion/extension   1x10 []     prayer stretch, cane flexion  1x10 hold 2-3 secs []        []        []        []        []        []        [] []          []         []        []        []        []        []        []        []        []      Therapeutic Exercise/Home Exercise Program:   30 minutes  See above  Access code [email or web address]  Bilateral PROM    Therapeutic Activity:  0 minutes     Gait: 0 minutes    Neuromuscular Re-Education:  0 minutes      Canalith Repositioning Procedure:      Manual Therapy:  10 minutes  Tissue mobilization and traction of skin in axillary webbing. Bilateral joint mobilization glenohumeral region, gentle. Modalities: 0 minutes    ASSESSMENT:  Tightness noted in left UE. Goals:   GOALS    Short Term Goals:   2  weeks Long Term Goals:   4  weeks   1). Establish HEP 1). Pt independent with HEP   2). Educate pt on lymphedema 2). Pt and sister understanding of lymphedema precautions   3). 3). Increase AROM B 170 flexion and abduction to 160.    4). 4). Tissue mobility good after beginning of radiation. 5). 5).   6). 6). Overall Progression Towards Functional goals/ Treatment Progress Update:  [] Patient is progressing as expected towards functional goals listed. [] Progression is slowed due to complexities/Impairments listed. [] Progression has been slowed due to co-morbidities.   [x] Plan just implemented, too soon to assess goals progression <30days   [] Goals require adjustment due to lack of progress  [] Patient is not progressing as expected and requires additional follow up with physician  [] Other    Prognosis for POC: [x] Good [] Fair  [] Poor    Patient requires continued skilled intervention: [x] Yes  [] No    Treatment/Activity Tolerance:  [x] Patient able to complete treatment  [] Patient limited by fatigue  [] Patient limited by pain    [] Patient limited by other medical complications  [] Other:         PLAN: See eval  [x] Continue per plan of care [] Alter current plan (see comments above)  [] Plan of care initiated [] Hold pending MD visit [] Discharge    Therapeutic Exercise and NMR EXR  [x] (99198) Provided verbal/tactile cueing for activities related to strengthening, flexibility, endurance, ROM  for improvements in proximal strength and core control with self care, mobility, lifting and ambulation.  [] (50488) Provided verbal/tactile cueing for activities related to improving balance, coordination, kinesthetic sense, posture, motor skill, proprioception  to assist with core control in self care, mobility, lifting, and ambulation. Therapeutic Activities and Gait:    [] (47585 or 33717) Provided verbal/tactile cueing for activities related to improving balance, coordination, kinesthetic sense, posture, motor skill, proprioception and motor activation to allow for proper function  with self care and ADLs  [] (27440) Provided training and instruction to the patient for proper core and proximal hip recruitment and positioning with ambulation re-education     Home Exercise Program:    [x] (80881) Reviewed/Progressed HEP activities related to strengthening, flexibility, endurance, ROM of core, proximal hip and LE for functional self-care, mobility, lifting and ambulation   [] (50446) Reviewed/Progressed HEP activities related to improving balance, coordination, kinesthetic sense, posture, motor skill, proprioception of core, proximal hip and LE for self care, mobility, lifting, and ambulation      Manual Treatments:  PROM / STM / Oscillations-Mobs:  G-I, II, III, IV (PA's, Inf., Post.)  [x] (90716) Provided manual therapy to mobilize proximal hip and LS spine soft tissue/joints for the purpose of modulating pain, promoting relaxation,  increasing ROM, reducing/eliminating soft tissue swelling/inflammation/restriction, improving soft tissue extensibility and allowing for proper ROM for normal function with self care, mobility, lifting and ambulation.      []CRP:  Canalith Repositioning procedure for the assessment, treatment and education of BPPV    Modalities:       Charges:  Timed Code Treatment Minutes: 40   Total Treatment Minutes: 40     Medicare Cap total YTD:        [x]N/A  Workers Comp Time Stamp  (Per CPT and Total Treatment) [x]N/A   Time In:   Time Out:       [] EVAL    [] Dry Needling  [] BU(19002)   x 1    [] EStim Unattended 51380  [] NMR (48942)  x     [] Estim Attended  75012  [] Manual (24003)  x2      [] Mechanical Txn 66081  [] TA    x     [] Ultrasound  [] Gait   x  [] Vaso  [] CRP    [] Ionto           [] Other:        Electronically signed by: Ramos Mendieta, PT, [id number]      Note: If patient does not return for scheduled/ recommended follow up visits, this note will serve as a discharge from care along with most recent update on progress.

## 2021-11-04 ENCOUNTER — HOSPITAL ENCOUNTER (OUTPATIENT)
Dept: PHYSICAL THERAPY | Age: 61
Setting detail: THERAPIES SERIES
Discharge: HOME | End: 2021-11-04
Payer: COMMERCIAL

## 2021-11-04 PROCEDURE — 97110 THERAPEUTIC EXERCISES: CPT

## 2021-11-04 PROCEDURE — 97140 MANUAL THERAPY 1/> REGIONS: CPT

## 2021-11-04 NOTE — FLOWSHEET NOTE
Physical Therapy Daily Treatment Note    [x]Daily Tx Note    []Progress Note    [] Discharge Summary         Date:  2021    Patient Name:  Venita Bassett    :  1960  MRN: [id number]      Medical/Treatment Diagnosis Information:  · Malignant neoplasm of upper-outer quadrant of right breast in female,estrogen receptor positive C50.411,Z17.0; S/P mastectomy, right Z90.11; at risk for lymphedema Z91.89               · Risk of lymphedema and decreased AROM bilateral UE. Insurance/Certification information: [employer] 32 units     Physician Information:       Plan of care signed :  []  Yes  [x] No  [x]  Cosign []  Fax    Date of Patient follow up with Physician:     Is this a Progress Report:     []  Yes  [x]  No      If Yes:  Date Range for reporting period:  Beginning 2021  Ending    Progress report will be due (10 Rx or 30 days whichever is less):       Recertification will be due (POC Duration  / 90 days whichever is less):       Visit # Insurance Allowable Auth Required   3/8  6/32 units  32 units [x]  Yes []  No        Functional Scale: SPADI and UEFI      Date 10/26/2021  Score  0/130 and  80/80    Latex Allergy:  [x]NO      []YES  Preferred Language for Healthcare:   [x]English       []other:    RESTRICTIONS/PRECAUTIONS:      SUBJECTIVE: Pt reports that she feels that shoulder is doing better with ROM. Pain level:  0/10    Plan Moving Forward/ For next visit:   · Exercise  · Tissue mobilization    ROM       OBJECTIVE: See eval    Exercises/Interventions:     Exercises in bold performed in department today. Items not bolded are carried forward from prior visits for continuity of the record.   Exercise/Equipment Resistance/Repetitions HEP Other comments   Exercise finger flexion/extension, wrist flexion/extension, elbow flexion/extension   1x10 reviewed []     prayer stretch, cane flexion  1x10 hold 2-3 secs []    shoulder shrugs and shoulder blade squeezes  1x10 hold 5 secs []    Shoulder flexion and abduction   1x10  []        []        []        []        []        []          []         []        []        []        []        []        []        []     UE bike   2 minutes forward and 2 backward seat 12 [] Monitor pt secondary to first time on bike. Therapeutic Exercise/Home Exercise Program:   30 minutes  See above  Access code [email or web address]  Bilateral PROM    Therapeutic Activity:  0 minutes     Gait: 0 minutes    Neuromuscular Re-Education:  0 minutes      Canalith Repositioning Procedure:      Manual Therapy:  10 minutes  Tissue mobilization and traction of skin in axillary webbing. Bilateral joint mobilization glenohumeral region, gentle. Modalities: 0 minutes    ASSESSMENT:  Pt tolerated ROM exercise as well as addition of exercise. Goals:   GOALS    Short Term Goals:   2  weeks Long Term Goals:   4  weeks   1). Establish HEP 1). Pt independent with HEP   2). Educate pt on lymphedema 2). Pt and sister understanding of lymphedema precautions   3). 3). Increase AROM B 170 flexion and abduction to 160.    4). 4). Tissue mobility good after beginning of radiation. 5). 5).   6). 6). Overall Progression Towards Functional goals/ Treatment Progress Update:  [] Patient is progressing as expected towards functional goals listed. [] Progression is slowed due to complexities/Impairments listed. [] Progression has been slowed due to co-morbidities.   [x] Plan just implemented, too soon to assess goals progression <30days   [] Goals require adjustment due to lack of progress  [] Patient is not progressing as expected and requires additional follow up with physician  [] Other    Prognosis for POC: [x] Good [] Fair  [] Poor    Patient requires continued skilled intervention: [x] Yes  [] No    Treatment/Activity Tolerance:  [x] Patient able to complete treatment  [] Patient limited by fatigue  [] Patient limited by pain    [] Patient limited by other medical complications  [] Other:       PLAN: See eval  [x] Continue per plan of care [] Alter current plan (see comments above)  [] Plan of care initiated [] Hold pending MD visit [] Discharge    Therapeutic Exercise and NMR EXR  [x] (38580) Provided verbal/tactile cueing for activities related to strengthening, flexibility, endurance, ROM  for improvements in proximal strength and core control with self care, mobility, lifting and ambulation.  [] (03723) Provided verbal/tactile cueing for activities related to improving balance, coordination, kinesthetic sense, posture, motor skill, proprioception  to assist with core control in self care, mobility, lifting, and ambulation.      Therapeutic Activities and Gait:    [] (99254 or 95487) Provided verbal/tactile cueing for activities related to improving balance, coordination, kinesthetic sense, posture, motor skill, proprioception and motor activation to allow for proper function  with self care and ADLs  [] (53811) Provided training and instruction to the patient for proper core and proximal hip recruitment and positioning with ambulation re-education     Home Exercise Program:    [x] (15876) Reviewed/Progressed HEP activities related to strengthening, flexibility, endurance, ROM of core, proximal hip and LE for functional self-care, mobility, lifting and ambulation   [] (40703) Reviewed/Progressed HEP activities related to improving balance, coordination, kinesthetic sense, posture, motor skill, proprioception of core, proximal hip and LE for self care, mobility, lifting, and ambulation      Manual Treatments:  PROM / STM / Oscillations-Mobs:  G-I, II, III, IV (PA's, Inf., Post.)  [x] (98266) Provided manual therapy to mobilize proximal hip and LS spine soft tissue/joints for the purpose of modulating pain, promoting relaxation,  increasing ROM, reducing/eliminating soft tissue swelling/inflammation/restriction, improving soft tissue extensibility and allowing for proper ROM for normal function with self care, mobility, lifting and ambulation. []CRP:  Canalith Repositioning procedure for the assessment, treatment and education of BPPV    Modalities:       Charges:  Timed Code Treatment Minutes: 40   Total Treatment Minutes: 40     Medicare Cap total YTD:        [x]N/A  Workers Comp Time Stamp  (Per CPT and Total Treatment) [x]N/A   Time In:   Time Out:       [] EVAL    [] Dry Needling  [] BP(20981)   x 2    [] EStim Unattended 50742  [] NMR (94025)  x     [] Estim Attended  19021  [] Manual (15064)  x1      [] Mechanical Txn 98638  [] TA    x     [] Ultrasound  [] Gait   x  [] Vaso  [] CRP    [] Ionto           [] Other:        Electronically signed by: Krish Lou PT, [id number]      Note: If patient does not return for scheduled/ recommended follow up visits, this note will serve as a discharge from care along with most recent update on progress.

## 2021-11-09 ENCOUNTER — HOSPITAL ENCOUNTER (OUTPATIENT)
Dept: PHYSICAL THERAPY | Age: 61
Setting detail: THERAPIES SERIES
Discharge: HOME | End: 2021-11-09
Payer: COMMERCIAL

## 2021-11-09 PROCEDURE — 97140 MANUAL THERAPY 1/> REGIONS: CPT

## 2021-11-09 PROCEDURE — 97110 THERAPEUTIC EXERCISES: CPT

## 2021-11-09 NOTE — FLOWSHEET NOTE
Physical Therapy Daily Treatment Note    [x]Daily Tx Note    []Progress Note    [] Discharge Summary         Date:  2021    Patient Name:  Roque Villeda    :  1960  MRN: [id number]      Medical/Treatment Diagnosis Information:  · Malignant neoplasm of upper-outer quadrant of right breast in female,estrogen receptor positive C50.411,Z17.0; S/P mastectomy, right Z90.11; at risk for lymphedema Z91.89               · Risk of lymphedema and decreased AROM bilateral UE. Insurance/Certification information: [employer] 32 units     Physician Information:   Carmen Reyes MD    Plan of care signed :  []  Yes  [x] No  [x]  Cosign []  Fax    Date of Patient follow up with Physician:     Is this a Progress Report:     []  Yes  [x]  No      If Yes:  Date Range for reporting period:  Beginning 10/26/2021  Ending    Progress report will be due (10 Rx or 30 days whichever is less):       Recertification will be due (POC Duration  / 90 days whichever is less):       Visit # Insurance Allowable Auth Required    units  32 units [x]  Yes []  No        Functional Scale: SPADI and UEFI      Date 10/26/2021  Score  0/130 and  80/80    Latex Allergy:  [x]NO      []YES  Preferred Language for Healthcare:   [x]English       []other:    RESTRICTIONS/PRECAUTIONS:      SUBJECTIVE: Pt reports that she thinks that exercises are helping her. Pt is able to reach in cabinet to get dishes out. Pain level:  0/10    Plan Moving Forward/ For next visit:   · Exercise  · Tissue mobilization    ROM       OBJECTIVE: See eval    Exercises/Interventions:     Exercises in bold performed in department today. Items not bolded are carried forward from prior visits for continuity of the record.   Exercise/Equipment Resistance/Repetitions HEP Other comments   Exercise finger flexion/extension, wrist flexion/extension, elbow flexion/extension   1x10 reviewed []     prayer stretch, cane flexion  1x10 hold 2-3 secs []    shoulder shrugs and shoulder blade squeezes  1x10 hold 5 secs []    Shoulder flexion and abduction   1x10  []    Mid and low rows   1x10  [] Issued red theraband   Standing flexion and abduction (45 degrees)   1x10 only to shoulder height []        []        []        []          []         []        []        []        []        []        []        []     UE bike   2 minutes forward and 2 backward seat 12 [] Monitor pt secondary to first time on bike. Therapeutic Exercise/Home Exercise Program:  25  minutes  See above  Access code [email or web address]  Bilateral PROM    Therapeutic Activity:  0 minutes     Gait: 0 minutes    Neuromuscular Re-Education:  0 minutes      Canalith Repositioning Procedure:      Manual Therapy:  15 minutes  Tissue mobilization and traction of skin in axillary webbing. Bilateral joint mobilization glenohumeral region, gentle. Scar massage right UE prior to first radiation tomorrow      Modalities: 0 minutes    ASSESSMENT:  Pt tolerated ROM exercise as well as addition of exercise. Pt dmonstrates increased note in tissue mobility after manual techniques. Goals:   GOALS    Short Term Goals:   2  weeks Long Term Goals:   4  weeks   1). Establish HEP 1). Pt independent with HEP   2). Educate pt on lymphedema 2). Pt and sister understanding of lymphedema precautions   3). 3). Increase AROM B 170 flexion and abduction to 160.    4). 4). Tissue mobility good after beginning of radiation. 5). 5).   6). 6). Overall Progression Towards Functional goals/ Treatment Progress Update:  [] Patient is progressing as expected towards functional goals listed. [] Progression is slowed due to complexities/Impairments listed. [] Progression has been slowed due to co-morbidities.   [x] Plan just implemented, too soon to assess goals progression <30days   [] Goals require adjustment due to lack of progress  [] Patient is not progressing as expected and requires additional follow up with physician  [] Other    Prognosis for POC: [x] Good [] Fair  [] Poor    Patient requires continued skilled intervention: [x] Yes  [] No    Treatment/Activity Tolerance:  [x] Patient able to complete treatment  [] Patient limited by fatigue  [] Patient limited by pain    [] Patient limited by other medical complications  [] Other:       PLAN: See eval  [x] Continue per plan of care [] Alter current plan (see comments above)  [] Plan of care initiated [] Hold pending MD visit [] Discharge    Therapeutic Exercise and NMR EXR  [x] (20066) Provided verbal/tactile cueing for activities related to strengthening, flexibility, endurance, ROM  for improvements in proximal strength and core control with self care, mobility, lifting and ambulation.  [] (93869) Provided verbal/tactile cueing for activities related to improving balance, coordination, kinesthetic sense, posture, motor skill, proprioception  to assist with core control in self care, mobility, lifting, and ambulation.      Therapeutic Activities and Gait:    [] (66938 or 73399) Provided verbal/tactile cueing for activities related to improving balance, coordination, kinesthetic sense, posture, motor skill, proprioception and motor activation to allow for proper function  with self care and ADLs  [] (00154) Provided training and instruction to the patient for proper core and proximal hip recruitment and positioning with ambulation re-education     Home Exercise Program:    [x] (58109) Reviewed/Progressed HEP activities related to strengthening, flexibility, endurance, ROM of core, proximal hip and LE for functional self-care, mobility, lifting and ambulation   [] (23949) Reviewed/Progressed HEP activities related to improving balance, coordination, kinesthetic sense, posture, motor skill, proprioception of core, proximal hip and LE for self care, mobility, lifting, and ambulation      Manual Treatments:  PROM / STM / Oscillations-Mobs:  G-I, II, III, IV (PA's, Inf., Post.)  [x] (05414) Provided manual therapy to mobilize proximal hip and LS spine soft tissue/joints for the purpose of modulating pain, promoting relaxation,  increasing ROM, reducing/eliminating soft tissue swelling/inflammation/restriction, improving soft tissue extensibility and allowing for proper ROM for normal function with self care, mobility, lifting and ambulation. []CRP:  Canalith Repositioning procedure for the assessment, treatment and education of BPPV    Modalities:       Charges:  Timed Code Treatment Minutes: 40   Total Treatment Minutes: 40     Medicare Cap total YTD:        [x]N/A  Workers Comp Time Stamp  (Per CPT and Total Treatment) [x]N/A   Time In:   Time Out:       [] EVAL    [] Dry Needling  [x] IW(83933)   x 2    [] EStim Unattended 10270  [] NMR (84475)  x     [] Estim Attended  39295  [x] Manual (97353)  x1      [] Mechanical Txn 98582  [] TA    x     [] Ultrasound  [] Gait   x  [] Vaso  [] CRP    [] Ionto           [] Other:        Electronically signed by: Jonatan Toure PT, [id number]      Note: If patient does not return for scheduled/ recommended follow up visits, this note will serve as a discharge from care along with most recent update on progress.

## 2021-11-11 ENCOUNTER — APPOINTMENT (OUTPATIENT)
Dept: PHYSICAL THERAPY | Age: 61
End: 2021-11-11
Payer: COMMERCIAL

## 2021-11-23 ENCOUNTER — HOSPITAL ENCOUNTER (OUTPATIENT)
Dept: PHYSICAL THERAPY | Age: 61
Setting detail: THERAPIES SERIES
Discharge: HOME | End: 2021-11-23
Payer: COMMERCIAL

## 2021-11-23 PROCEDURE — 97110 THERAPEUTIC EXERCISES: CPT

## 2021-11-23 PROCEDURE — 97140 MANUAL THERAPY 1/> REGIONS: CPT

## 2021-11-23 NOTE — PROGRESS NOTES
Physical Therapy Daily Treatment Note    [x]Daily Tx Note    [x]Progress Note    [] Discharge Summary         Date:  2021    Patient Name:  Mainor Casanova    :  1960  MRN: [id number]      Medical/Treatment Diagnosis Information:  · Malignant neoplasm of upper-outer quadrant of right breast in female,estrogen receptor positive C50.411,Z17.0; S/P mastectomy, right Z90.11; at risk for lymphedema Z91.89               · Risk of lymphedema and decreased AROM bilateral UE.                                 Insurance/Certification information: [employer] 32 units until 12/3/2021    Physician Information:   Robert Sousa MD    Plan of care signed :  []  Yes  [x] No  [x]  Cosign []  Fax    Date of Patient follow up with Physician:     Is this a Progress Report:     []  Yes  [x]  No      If Yes:  Date Range for reporting period:  Beginning 10/26/2021  Ending 2021    Progress report will be due (10 Rx or 30 days whichever is less):    Recertification will be due (POC Duration  / 90 days whichever is less):       Visit # Insurance Allowable Auth Required     15/32 units   1 eval  5 MA  9 TE 32 units [x]  Yes []  No        Functional Scale: SPADI and UEFI      Date 10/26/2021  Score  0/130 and  80/80    Latex Allergy:  [x]NO      []YES  Preferred Language for Healthcare:   [x]English       []other:    RESTRICTIONS/PRECAUTIONS:      SUBJECTIVE: Pt reports that she is doing good through radiation just some discoloration which she in putting   Pain level:  0/10    Plan Moving Forward/ For next visit:   · Exercise  · Tissue mobilization    ROM       OBJECTIVE: See eval  Upper/ Extremity R L R L   Date 10/26 10/26 11/23 11/23          6 Inches above bend of elbow                           50 50 50.2 49.8         3  Inches above bend of elbow 43.5 45 43 44.8           Bend of elbow 31 31.5 29.2 31.4         3  Inches below bend of elbow 31 31 30.4 30.5          6 Inches below bend of elbow 24.1 24.6 24.1 24.1   Wrist-Styloids                                   17.9 17.4 17.5 17.1   Distal Palmar Crease 19.9 19.4 18.5 18.2   Thumbs Proximal Phalanx 6.9 6.9 6.5 6.4   Base of 3rd digit 6.5 6.5 6.2 6.2             TOTAL GIRTH               230.8        232.3 225.6 228.5   No noted edema in right UE    Exercises/Interventions:     Exercises in bold performed in department today. Items not bolded are carried forward from prior visits for continuity of the record. Exercise/Equipment Resistance/Repetitions HEP Other comments   Exercise finger flexion/extension, wrist flexion/extension, elbow flexion/extension   1x10  []     prayer stretch, cane flexion  1x10 hold 2-3 secs []    shoulder shrugs and shoulder blade squeezes  1x10 hold 5 secs []    Shoulder flexion and abduction   1x10  []    Mid and low rows   1x10  []  red theraband   Standing flexion and abduction (45 degrees)   1x10 only to shoulder height []        []        []        []          []         []        []        []        []        []        []        []     UE bike   2 minutes forward and 2 backward seat 12 [] Monitor pt secondary to first time on bike. Therapeutic Exercise/Home Exercise Program: 29   minutes  See above; reviewed all exercises   Access code [email or web address]  Bilateral PROM  PROM: flex R 171, L 140 guarding; abd R 170, L 150 guarding; ER R 90 at 90 degrees abd, L 75 at 90 degrees abd; IR R 85 at 85 degrees abd, L 70 at 85 degrees abd    AROM: flex R 171, L 155; abd R 170, L 145; ER B T3; IR mid thoracic region    Therapeutic Activity:  0 minutes     Gait: 0 minutes    Neuromuscular Re-Education:  0 minutes      Canalith Repositioning Procedure:      Manual Therapy: 10  minutes  Tissue mobilization and traction of skin in right arm. Bilateral joint mobilization glenohumeral region, gentle. Girth measurements see above. Modalities: 0 minutes    ASSESSMENT:  Pt tolerated ROM exercise as well as addition of exercise.  Pt dmonstrates increased note in tissue mobility after manual techniques. Goals:   GOALS    Short Term Goals:   2  weeks Long Term Goals:   4  weeks   1). Establish HEP-MET 1). Pt independent with HEP   2). Educate pt on lymphedema-MET 2). Pt and sister understanding of lymphedema precautions   3). 3). Increase AROM B 170 flexion and abduction to 160.-MET for right UE    4). 4). Tissue mobility good after beginning of radiation. 5). 5).   6). 6). Overall Progression Towards Functional goals/ Treatment Progress Update:  [] Patient is progressing as expected towards functional goals listed. [] Progression is slowed due to complexities/Impairments listed. [] Progression has been slowed due to co-morbidities. [x] Plan just implemented, too soon to assess goals progression <30days   [] Goals require adjustment due to lack of progress  [] Patient is not progressing as expected and requires additional follow up with physician  [] Other    Prognosis for POC: [x] Good [] Fair  [] Poor    Patient requires continued skilled intervention: [x] Yes  [] No    Treatment/Activity Tolerance:  [x] Patient able to complete treatment  [] Patient limited by fatigue  [] Patient limited by pain    [] Patient limited by other medical complications  [] Other:       PLAN: See eval  [x] Continue per plan of care [] Alter current plan (see comments above)  [] Plan of care initiated [] Hold pending MD visit [] Discharge  Will see pt 1 time per week for 2-3 more weeks to assess through radiation for any limitations.      Therapeutic Exercise and NMR EXR  [x] (76267) Provided verbal/tactile cueing for activities related to strengthening, flexibility, endurance, ROM  for improvements in proximal strength and core control with self care, mobility, lifting and ambulation.  [] (77446) Provided verbal/tactile cueing for activities related to improving balance, coordination, kinesthetic sense, posture, motor skill, proprioception  to assist with core control in self care, mobility, lifting, and ambulation. Therapeutic Activities and Gait:    [] (87082 or 53639) Provided verbal/tactile cueing for activities related to improving balance, coordination, kinesthetic sense, posture, motor skill, proprioception and motor activation to allow for proper function  with self care and ADLs  [] (20532) Provided training and instruction to the patient for proper core and proximal hip recruitment and positioning with ambulation re-education     Home Exercise Program:    [x] (58253) Reviewed/Progressed HEP activities related to strengthening, flexibility, endurance, ROM of core, proximal hip and LE for functional self-care, mobility, lifting and ambulation   [] (87293) Reviewed/Progressed HEP activities related to improving balance, coordination, kinesthetic sense, posture, motor skill, proprioception of core, proximal hip and LE for self care, mobility, lifting, and ambulation      Manual Treatments:  PROM / STM / Oscillations-Mobs:  G-I, II, III, IV (PA's, Inf., Post.)  [x] (98015) Provided manual therapy to mobilize proximal hip and LS spine soft tissue/joints for the purpose of modulating pain, promoting relaxation,  increasing ROM, reducing/eliminating soft tissue swelling/inflammation/restriction, improving soft tissue extensibility and allowing for proper ROM for normal function with self care, mobility, lifting and ambulation.      []CRP:  Canalith Repositioning procedure for the assessment, treatment and education of BPPV    Modalities:       Charges:  Timed Code Treatment Minutes: 39   Total Treatment Minutes: 39     Medicare Cap total YTD:        [x]N/A  Workers Comp Time Stamp  (Per CPT and Total Treatment) [x]N/A   Time In:   Time Out:       [] EVAL    [] Dry Needling  [x] JT(67750)   x 2    [] EStim Unattended 42623  [] NMR (36402)  x     [] Estim Attended  31065  [x] Manual (18943)  x1      [] Mechanical Txn 85749  [] TA    x     [] Ultrasound  [] Gait   x  [] Vaso  [] CRP    [] Ionto           [] Other:        Electronically signed by: Arron Christie, PT, [id number]      Note: If patient does not return for scheduled/ recommended follow up visits, this note will serve as a discharge from care along with most recent update on progress.

## 2021-12-02 ENCOUNTER — HOSPITAL ENCOUNTER (OUTPATIENT)
Dept: PHYSICAL THERAPY | Age: 61
Setting detail: THERAPIES SERIES
Discharge: HOME | End: 2021-12-02
Payer: COMMERCIAL

## 2021-12-02 PROCEDURE — 97110 THERAPEUTIC EXERCISES: CPT

## 2021-12-02 NOTE — PROGRESS NOTES
flexion/extension   1x10  []     prayer stretch, cane flexion  1x10 hold 2-3 secs []    shoulder shrugs and shoulder blade squeezes  1x10 hold 2-3 secs []    Mid and low rows   1x10  []  red theraband   Standing flexion and abduction (45 degrees)   1x10 only to shoulder height []        []        []        []          []         []        []        []        []        []        []        []     UE bike   3 minutes forward and 3 backward seat 12 [] Monitor pt secondary to first time on bike. Therapeutic Exercise/Home Exercise Program: 24 minutes  See above; reviewed all exercises   Access code [email or web address]  Bilateral PROM  PROM: flex R 171, L 140 guarding; abd R 170, L 150 guarding; ER R 90 at 90 degrees abd, L 75 at 90 degrees abd; IR R 85 at 85 degrees abd, L 70 at 85 degrees abd  AROM: flex R 171, L 155; abd R 170, L 145; ER B T3; IR mid thoracic region    Therapeutic Activity:  0 minutes     Gait: 0 minutes    Neuromuscular Re-Education:  0 minutes      Canalith Repositioning Procedure:      Manual Therapy: 0  minutes    Modalities: 0 minutes    ASSESSMENT:  Pt tolerated ROM exercise as well as addition of exercise. Pt dmonstrates increased note in tissue mobility after manual techniques. Goals:   GOALS    Short Term Goals:   2  weeks Long Term Goals:   4  weeks   1). Establish HEP-MET 1). Pt independent with HEP   2). Educate pt on lymphedema-MET 2). Pt and sister understanding of lymphedema precautions-MET   3). 3). Increase AROM B 170 flexion and abduction to 160.-MET for right UE    4). 4). Tissue mobility good after beginning of radiation. 5). 5).   6). 6). Overall Progression Towards Functional goals/ Treatment Progress Update:  [x] Patient is progressing as expected towards functional goals listed. [] Progression is slowed due to complexities/Impairments listed. [] Progression has been slowed due to co-morbidities.   [] Plan just implemented, too soon to assess goals progression <30days   [] Goals require adjustment due to lack of progress  [] Patient is not progressing as expected and requires additional follow up with physician  [] Other    Prognosis for POC: [x] Good [] Fair  [] Poor    Patient requires continued skilled intervention: [x] Yes  [] No    Treatment/Activity Tolerance:  [x] Patient able to complete treatment  [] Patient limited by fatigue  [] Patient limited by pain    [] Patient limited by other medical complications  [] Other:       PLAN: See eval  [x] Continue per plan of care [] Alter current plan (see comments above)  [] Plan of care initiated [] Hold pending MD visit [] Discharge  Will see pt 1 time per week next week and then see 1 more visit in 2 weeks to assess tissue mobility and AROM/PROM. Recommend to see pt through to make sure no limitation from radiation. Therapeutic Exercise and NMR EXR  [x] (71641) Provided verbal/tactile cueing for activities related to strengthening, flexibility, endurance, ROM  for improvements in proximal strength and core control with self care, mobility, lifting and ambulation.  [] (29861) Provided verbal/tactile cueing for activities related to improving balance, coordination, kinesthetic sense, posture, motor skill, proprioception  to assist with core control in self care, mobility, lifting, and ambulation.      Therapeutic Activities and Gait:    [] (04912 or 97620) Provided verbal/tactile cueing for activities related to improving balance, coordination, kinesthetic sense, posture, motor skill, proprioception and motor activation to allow for proper function  with self care and ADLs  [] (31757) Provided training and instruction to the patient for proper core and proximal hip recruitment and positioning with ambulation re-education     Home Exercise Program:    [x] (27182) Reviewed/Progressed HEP activities related to strengthening, flexibility, endurance, ROM of core, proximal hip and LE for functional self-care, mobility, lifting and ambulation   [] (64967) Reviewed/Progressed HEP activities related to improving balance, coordination, kinesthetic sense, posture, motor skill, proprioception of core, proximal hip and LE for self care, mobility, lifting, and ambulation      Manual Treatments:  PROM / STM / Oscillations-Mobs:  G-I, II, III, IV (PA's, Inf., Post.)  [x] (11995) Provided manual therapy to mobilize proximal hip and LS spine soft tissue/joints for the purpose of modulating pain, promoting relaxation,  increasing ROM, reducing/eliminating soft tissue swelling/inflammation/restriction, improving soft tissue extensibility and allowing for proper ROM for normal function with self care, mobility, lifting and ambulation. []CRP:  Canalith Repositioning procedure for the assessment, treatment and education of BPPV    Modalities:       Charges:  Timed Code Treatment Minutes: 24   Total Treatment Minutes: 30     Medicare Cap total YTD:        [x]N/A  Workers Comp Time Stamp  (Per CPT and Total Treatment) [x]N/A   Time In:   Time Out:       [] EVAL    [] Dry Needling  [x] SQ(85994)   x 2    [] EStim Unattended 54977  [] NMR (38998)  x     [] Estim Attended  72078  [] Manual (53522)  x      [] Mechanical Txn 22006  [] TA    x     [] Ultrasound  [] Gait   x  [] Vaso  [] CRP    [] Ionto           [] Other:        Electronically signed by: Dottie Trejo PT, [id number]      Note: If patient does not return for scheduled/ recommended follow up visits, this note will serve as a discharge from care along with most recent update on progress.

## 2021-12-09 ENCOUNTER — HOSPITAL ENCOUNTER (OUTPATIENT)
Dept: PHYSICAL THERAPY | Age: 61
Setting detail: THERAPIES SERIES
Discharge: HOME | End: 2021-12-09
Payer: COMMERCIAL

## 2021-12-09 PROCEDURE — 97110 THERAPEUTIC EXERCISES: CPT

## 2021-12-09 NOTE — PROGRESS NOTES
Other comments   Exercise finger flexion/extension, wrist flexion/extension, elbow flexion/extension   1x10  []     prayer stretch, cane flexion  1x10 hold 2-3 secs []    shoulder shrugs and shoulder blade squeezes  1x10 hold 2-3 secs []    Mid and low rows   1x10  []  red theraband   Standing flexion and abduction (45 degrees)   1x10 only to shoulder height and 1 lb wieight []    ER wall stretch; IR behind back with other hand   3 reps hold 10 seconds  []    ceiling punches   1x10 []        []          []         []        []        []        []        []        []        []     UE bike   3 minutes forward and 3 backward seat 12 []      Therapeutic Exercise/Home Exercise Program: 24 minutes  See above; reviewed all exercises   Access code [email or web address]    Therapeutic Activity:  0 minutes     Gait: 0 minutes    Neuromuscular Re-Education:  0 minutes      Canalith Repositioning Procedure:      Manual Therapy: 0  minutes    Modalities: 0 minutes    ASSESSMENT:  Slight limitation with ER and IR added stretch with no noted difficulty. Goals:   GOALS    Short Term Goals:   2  weeks Long Term Goals:   4  weeks   1). Establish HEP-MET 1). Pt independent with HEP   2). Educate pt on lymphedema-MET 2). Pt and sister understanding of lymphedema precautions-MET   3). 3). Increase AROM B 170 flexion and abduction to 160.-MET for right UE    4). 4). Tissue mobility good after beginning of radiation. 5). 5).   6). 6). Overall Progression Towards Functional goals/ Treatment Progress Update:  [x] Patient is progressing as expected towards functional goals listed. [] Progression is slowed due to complexities/Impairments listed. [] Progression has been slowed due to co-morbidities.   [] Plan just implemented, too soon to assess goals progression <30days   [] Goals require adjustment due to lack of progress  [] Patient is not progressing as expected and requires additional follow up with physician  [] Other    Prognosis for POC: [x] Good [] Fair  [] Poor    Patient requires continued skilled intervention: [x] Yes  [] No    Treatment/Activity Tolerance:  [x] Patient able to complete treatment  [] Patient limited by fatigue  [] Patient limited by pain    [] Patient limited by other medical complications  [] Other:       PLAN: See eval  [x] Continue per plan of care [] Alter current plan (see comments above)  [] Plan of care initiated [] Hold pending MD visit [] Discharge  Will see 1 more visit in 2 weeks to assess tissue mobility and AROM/PROM. Recommend to see pt through to make sure no limitation from radiation. Therapeutic Exercise and NMR EXR  [x] (26525) Provided verbal/tactile cueing for activities related to strengthening, flexibility, endurance, ROM  for improvements in proximal strength and core control with self care, mobility, lifting and ambulation.  [] (55713) Provided verbal/tactile cueing for activities related to improving balance, coordination, kinesthetic sense, posture, motor skill, proprioception  to assist with core control in self care, mobility, lifting, and ambulation.      Therapeutic Activities and Gait:    [] (18039 or 77842) Provided verbal/tactile cueing for activities related to improving balance, coordination, kinesthetic sense, posture, motor skill, proprioception and motor activation to allow for proper function  with self care and ADLs  [] (94968) Provided training and instruction to the patient for proper core and proximal hip recruitment and positioning with ambulation re-education     Home Exercise Program:    [x] (50382) Reviewed/Progressed HEP activities related to strengthening, flexibility, endurance, ROM of core, proximal hip and LE for functional self-care, mobility, lifting and ambulation   [] (47079) Reviewed/Progressed HEP activities related to improving balance, coordination, kinesthetic sense, posture, motor skill, proprioception of core, proximal hip and LE for self care, mobility, lifting, and ambulation      Manual Treatments:  PROM / STM / Oscillations-Mobs:  G-I, II, III, IV (PA's, Inf., Post.)  [x] (14626) Provided manual therapy to mobilize proximal hip and LS spine soft tissue/joints for the purpose of modulating pain, promoting relaxation,  increasing ROM, reducing/eliminating soft tissue swelling/inflammation/restriction, improving soft tissue extensibility and allowing for proper ROM for normal function with self care, mobility, lifting and ambulation. []CRP:  Canalith Repositioning procedure for the assessment, treatment and education of BPPV    Modalities:       Charges:  Timed Code Treatment Minutes: 40   Total Treatment Minutes: 40     Medicare Cap total YTD:        [x]N/A  Workers Comp Time Stamp  (Per CPT and Total Treatment) [x]N/A   Time In:   Time Out:       [] EVAL    [] Dry Needling  [x] OI(03732)   x 3   [] EStim Unattended 01860  [] NMR (99349)  x     [] Estim Attended  45098  [] Manual (52771)  x      [] Mechanical Txn 39773  [] TA    x     [] Ultrasound  [] Gait   x  [] Vaso  [] CRP    [] Ionto           [] Other:        Electronically signed by: Arron Christie PT, [id number]      Note: If patient does not return for scheduled/ recommended follow up visits, this note will serve as a discharge from care along with most recent update on progress.

## 2021-12-14 ENCOUNTER — APPOINTMENT (OUTPATIENT)
Dept: PHYSICAL THERAPY | Age: 61
End: 2021-12-14
Payer: COMMERCIAL

## 2021-12-21 ENCOUNTER — HOSPITAL ENCOUNTER (OUTPATIENT)
Dept: PHYSICAL THERAPY | Age: 61
Setting detail: THERAPIES SERIES
Discharge: HOME | End: 2021-12-21
Payer: COMMERCIAL

## 2021-12-21 PROCEDURE — 97110 THERAPEUTIC EXERCISES: CPT

## 2021-12-21 NOTE — PROGRESS NOTES
Physical Therapy Daily Treatment Note    [x]Daily Tx Note    []Progress Note    [x] Discharge Summary         Date:  2021    Patient Name:  Cori Marmolejo    :  1960  MRN: [id number]      Medical/Treatment Diagnosis Information:  · Malignant neoplasm of upper-outer quadrant of right breast in female,estrogen receptor positive C50.411,Z17.0; S/P mastectomy, right Z90.11; at risk for lymphedema Z91.89               · Risk of lymphedema and decreased AROM bilateral UE. Insurance/Certification information: [employer] 32 units until 12/3/2021; approved from 2021 to 2021    Physician Information:   Risa Wheat MD    Plan of care signed :  []  Yes  [x] No  [x]  Cosign []  Fax    Date of Patient follow up with Physician:     Is this a Progress Report:     []  Yes  [x]  No      If Yes:  Date Range for reporting period:  Beginning 10/26/2021  Ending 2021    Progress report will be due (10 Rx or 30 days whichever is less): Discharge  Recertification will be due (POC Duration  / 90 days whichever is less):       Visit # Insurance Allowable Auth Required     3/32 units   1 eval  5 MA  14 TE 32 units  Plus 8 units= 40 total [x]  Yes []  No        Functional Scale: SPADI and UEFI      Date 2021  Score  5/130 and  79/80 secondary to blisters from radiation; other wise no pain. Latex Allergy:  [x]NO      []YES  Preferred Language for Healthcare:   [x]English       []other:    RESTRICTIONS/PRECAUTIONS:      SUBJECTIVE: Pt reports that she started with blisters last week. Pt reports that she has 2 different creams to put on blisters. Pt's last radiation is .      Pain level:  0/10 beginning; 1/10 with stretch at end range flexion and IR; end of treatment 0/10     Plan Moving Forward/ For next visit:   · Exercise  · Tissue mobilization    ROM       OBJECTIVE: See eval  Observation: pt demonstrates opened blisters in fold of mastectomy. Instructed pt to follow with radiologist and/or nurse at radiation to see if pt can use cotton t-shirt of wicking material under area secondary to pt c/o sweating. Exercises/Interventions:     Exercises in bold performed in department today. Items not bolded are carried forward from prior visits for continuity of the record. Exercise/Equipment Resistance/Repetitions HEP Other comments   Exercise finger flexion/extension, wrist flexion/extension, elbow flexion/extension   1x10  [] No noted discomfort    prayer stretch, cane flexion  1x10 hold 2-3 secs [] Discuss with MD on if ok to stretch end range secondary to blisters. Slight discomfort at end range at blister area. shoulder shrugs and shoulder blade squeezes  1x10 hold 2-3 secs [] Shoulder blade squeezes modified to hold arms out a little so that blister area does not rub. Mid and low rows   1x10  []  red theraband   Standing flexion and abduction (45 degrees)   1x10 only to shoulder height no weights used today [] No noted discomfort   ER wall stretch; IR behind back with other hand   3 reps hold 10 seconds  [] Slight discomfort 2/10 at blister region   ceiling punches   1x10 [] No noted discomfort       []          []         []        []        []        []        []        []        []     UE bike   3 minutes forward and 3 backward seat 12 []      Therapeutic Exercise/Home Exercise Program: 46 minutes  See above; reviewed all exercises   Access code [address]  Reviewed all exercise. PROM right UE: flexion and abduction 175; ER R 90 at 90 degrees abd; IR 85 at 85 degrees abd  AROM: flex and abduction 170; ER T3, IR mid thoracic     Therapeutic Activity:  0 minutes     Gait: 0 minutes    Neuromuscular Re-Education:  0 minutes      Canalith Repositioning Procedure:      Manual Therapy: 0  minutes    Modalities: 0 minutes    ASSESSMENT:  Slight limitation with ER and IR added stretch with no noted difficulty.    Goals:   GOALS    Short Term Goals:   2  weeks Long Term Goals:   4  weeks   1). Establish HEP-MET 1). Pt independent with HEP-MET   2). Educate pt on lymphedema-MET 2). Pt and sister understanding of lymphedema precautions-MET   3). 3). Increase AROM B 170 flexion and abduction to 160.-MET for right UE    4). 4). Tissue mobility good after beginning of radiation. -Good around radiated tissue some limited do to blisters. 5). 5).   6). 6). Overall Progression Towards Functional goals/ Treatment Progress Update:  [x] Patient is progressing as expected towards functional goals listed. [] Progression is slowed due to complexities/Impairments listed. [] Progression has been slowed due to co-morbidities. [] Plan just implemented, too soon to assess goals progression <30days   [] Goals require adjustment due to lack of progress  [] Patient is not progressing as expected and requires additional follow up with physician  [] Other    Prognosis for POC: [x] Good [] Fair  [] Poor    Patient requires continued skilled intervention: [x] Yes  [] No    Treatment/Activity Tolerance:  [x] Patient able to complete treatment  [] Patient limited by fatigue  [] Patient limited by pain    [] Patient limited by other medical complications  [] Other:       PLAN: See eval  [] Continue per plan of care [] Alter current plan (see comments above)  [] Plan of care initiated [] Hold pending MD visit [x] Discharge  Instructed pt to follow with radiologist and/or nurse at radiation to see if pt can use cotton t-shirt of wicking material under area secondary to pt c/o sweating.  If ok pt to call PT and will give pt information on material.      Therapeutic Exercise and NMR EXR  [x] (33737) Provided verbal/tactile cueing for activities related to strengthening, flexibility, endurance, ROM  for improvements in proximal strength and core control with self care, mobility, lifting and ambulation.  [] (43364) Provided verbal/tactile cueing for activities related to improving balance, coordination, kinesthetic sense, posture, motor skill, proprioception  to assist with core control in self care, mobility, lifting, and ambulation. Therapeutic Activities and Gait:    [] (12377 or 83605) Provided verbal/tactile cueing for activities related to improving balance, coordination, kinesthetic sense, posture, motor skill, proprioception and motor activation to allow for proper function  with self care and ADLs  [] (00542) Provided training and instruction to the patient for proper core and proximal hip recruitment and positioning with ambulation re-education     Home Exercise Program:    [x] (10183) Reviewed/Progressed HEP activities related to strengthening, flexibility, endurance, ROM of core, proximal hip and LE for functional self-care, mobility, lifting and ambulation   [] (32646) Reviewed/Progressed HEP activities related to improving balance, coordination, kinesthetic sense, posture, motor skill, proprioception of core, proximal hip and LE for self care, mobility, lifting, and ambulation      Manual Treatments:  PROM / STM / Oscillations-Mobs:  G-I, II, III, IV (PA's, Inf., Post.)  [] (69546) Provided manual therapy to mobilize proximal hip and LS spine soft tissue/joints for the purpose of modulating pain, promoting relaxation,  increasing ROM, reducing/eliminating soft tissue swelling/inflammation/restriction, improving soft tissue extensibility and allowing for proper ROM for normal function with self care, mobility, lifting and ambulation.      []CRP:  Canalith Repositioning procedure for the assessment, treatment and education of BPPV    Modalities:       Charges:  Timed Code Treatment Minutes: 46   Total Treatment Minutes: 46     Medicare Cap total YTD:        [x]N/A  Workers Comp Time Stamp  (Per CPT and Total Treatment) [x]N/A   Time In:   Time Out:       [] EVAL    [] Dry Needling  [x] SO(74438)   x 3   [] EStim Unattended 65376  [] NMR (68942)  x     [] Estim Attended  00903  [] Manual (16367)  x      [] Mechanical Txn 75242  [] TA    x     [] Ultrasound  [] Gait   x  [] Vaso  [] CRP    [] Ionto           [] Other:        Electronically signed by: Cheryl Dowling PT, [id number]      Note: If patient does not return for scheduled/ recommended follow up visits, this note will serve as a discharge from care along with most recent update on progress.

## 2021-12-28 ENCOUNTER — APPOINTMENT (OUTPATIENT)
Dept: PHYSICAL THERAPY | Age: 61
End: 2021-12-28
Payer: COMMERCIAL

## 2022-02-25 ENCOUNTER — HOSPITAL ENCOUNTER (OUTPATIENT)
Dept: WOMENS IMAGING | Age: 62
Discharge: HOME | End: 2022-02-25
Payer: COMMERCIAL

## 2022-02-25 DIAGNOSIS — Z12.31 ENCOUNTER FOR SCREENING MAMMOGRAM FOR BREAST CANCER: ICD-10-CM

## 2022-02-25 PROCEDURE — 77063 BREAST TOMOSYNTHESIS BI: CPT

## 2022-12-05 ENCOUNTER — HOSPITAL ENCOUNTER (EMERGENCY)
Age: 62
Discharge: HOME | End: 2022-12-05
Payer: COMMERCIAL

## 2022-12-05 ENCOUNTER — APPOINTMENT (OUTPATIENT)
Dept: GENERAL RADIOLOGY | Age: 62
End: 2022-12-05
Payer: COMMERCIAL

## 2022-12-05 ENCOUNTER — APPOINTMENT (OUTPATIENT)
Dept: CT IMAGING | Age: 62
End: 2022-12-05
Payer: COMMERCIAL

## 2022-12-05 VITALS
WEIGHT: 293 LBS | SYSTOLIC BLOOD PRESSURE: 145 MMHG | RESPIRATION RATE: 16 BRPM | DIASTOLIC BLOOD PRESSURE: 80 MMHG | HEART RATE: 70 BPM | OXYGEN SATURATION: 98 % | HEIGHT: 63 IN | TEMPERATURE: 97.5 F | BODY MASS INDEX: 51.91 KG/M2

## 2022-12-05 DIAGNOSIS — R10.11 ABDOMINAL PAIN, RIGHT UPPER QUADRANT: Primary | ICD-10-CM

## 2022-12-05 DIAGNOSIS — D25.9 UTERINE LEIOMYOMA, UNSPECIFIED LOCATION: ICD-10-CM

## 2022-12-05 DIAGNOSIS — K76.0 HEPATIC STEATOSIS: ICD-10-CM

## 2022-12-05 LAB
A/G RATIO: 1.3 (ref 1.1–2.2)
ALBUMIN SERPL-MCNC: 3.9 G/DL (ref 3.4–5)
ALP BLD-CCNC: 128 U/L (ref 40–129)
ALT SERPL-CCNC: 21 U/L (ref 10–40)
ANION GAP SERPL CALCULATED.3IONS-SCNC: 10 MMOL/L (ref 3–16)
AST SERPL-CCNC: 12 U/L (ref 15–37)
BASOPHILS ABSOLUTE: 0.1 K/UL (ref 0–0.2)
BASOPHILS RELATIVE PERCENT: 0.9 %
BILIRUB SERPL-MCNC: 0.3 MG/DL (ref 0–1)
BILIRUBIN URINE: NEGATIVE
BLOOD, URINE: NEGATIVE
BUN BLDV-MCNC: 19 MG/DL (ref 7–20)
CALCIUM SERPL-MCNC: 10 MG/DL (ref 8.3–10.6)
CHLORIDE BLD-SCNC: 96 MMOL/L (ref 99–110)
CLARITY: CLEAR
CO2: 28 MMOL/L (ref 21–32)
COLOR: YELLOW
CREAT SERPL-MCNC: 0.9 MG/DL (ref 0.6–1.2)
EKG ATRIAL RATE: 68 BPM
EKG DIAGNOSIS: NORMAL
EKG P AXIS: 61 DEGREES
EKG P-R INTERVAL: 164 MS
EKG Q-T INTERVAL: 416 MS
EKG QRS DURATION: 90 MS
EKG QTC CALCULATION (BAZETT): 442 MS
EKG R AXIS: -28 DEGREES
EKG T AXIS: 20 DEGREES
EKG VENTRICULAR RATE: 68 BPM
EOSINOPHILS ABSOLUTE: 0.2 K/UL (ref 0–0.6)
EOSINOPHILS RELATIVE PERCENT: 2.2 %
GFR SERPL CREATININE-BSD FRML MDRD: >60 ML/MIN/{1.73_M2}
GLUCOSE BLD-MCNC: 104 MG/DL (ref 70–99)
GLUCOSE URINE: >=1000 MG/DL
HCT VFR BLD CALC: 43.7 % (ref 36–48)
HEMOGLOBIN: 14.5 G/DL (ref 12–16)
INFLUENZA A: NOT DETECTED
INFLUENZA B: NOT DETECTED
KETONES, URINE: NEGATIVE MG/DL
LEUKOCYTE ESTERASE, URINE: NEGATIVE
LIPASE: 32 U/L (ref 13–60)
LYMPHOCYTES ABSOLUTE: 1.4 K/UL (ref 1–5.1)
LYMPHOCYTES RELATIVE PERCENT: 14.9 %
MCH RBC QN AUTO: 29.6 PG (ref 26–34)
MCHC RBC AUTO-ENTMCNC: 33.1 G/DL (ref 31–36)
MCV RBC AUTO: 89.3 FL (ref 80–100)
MICROSCOPIC EXAMINATION: ABNORMAL
MONOCYTES ABSOLUTE: 0.9 K/UL (ref 0–1.3)
MONOCYTES RELATIVE PERCENT: 10 %
NEUTROPHILS ABSOLUTE: 6.6 K/UL (ref 1.7–7.7)
NEUTROPHILS RELATIVE PERCENT: 72 %
NITRITE, URINE: NEGATIVE
PDW BLD-RTO: 14.5 % (ref 12.4–15.4)
PH UA: 5.5 (ref 5–8)
PLATELET # BLD: 239 K/UL (ref 135–450)
PMV BLD AUTO: 8 FL (ref 5–10.5)
POTASSIUM REFLEX MAGNESIUM: 4.7 MMOL/L (ref 3.5–5.1)
PROTEIN UA: NEGATIVE MG/DL
RBC # BLD: 4.9 M/UL (ref 4–5.2)
REASON FOR REJECTION: NORMAL
REJECTED TEST: NORMAL
SARS-COV-2 RNA, RT PCR: NOT DETECTED
SODIUM BLD-SCNC: 134 MMOL/L (ref 136–145)
SPECIFIC GRAVITY UA: 1.02 (ref 1–1.03)
TOTAL PROTEIN: 7 G/DL (ref 6.4–8.2)
TROPONIN: <0.01 NG/ML
URINE REFLEX TO CULTURE: ABNORMAL
URINE TYPE: ABNORMAL
UROBILINOGEN, URINE: 0.2 E.U./DL
WBC # BLD: 9.1 K/UL (ref 4–11)

## 2022-12-05 PROCEDURE — 96372 THER/PROPH/DIAG INJ SC/IM: CPT

## 2022-12-05 PROCEDURE — 93010 ELECTROCARDIOGRAM REPORT: CPT | Performed by: INTERNAL MEDICINE

## 2022-12-05 PROCEDURE — 84484 ASSAY OF TROPONIN QUANT: CPT

## 2022-12-05 PROCEDURE — 6370000000 HC RX 637 (ALT 250 FOR IP): Performed by: PHYSICIAN ASSISTANT

## 2022-12-05 PROCEDURE — 83690 ASSAY OF LIPASE: CPT

## 2022-12-05 PROCEDURE — 71045 X-RAY EXAM CHEST 1 VIEW: CPT

## 2022-12-05 PROCEDURE — 96374 THER/PROPH/DIAG INJ IV PUSH: CPT

## 2022-12-05 PROCEDURE — 93005 ELECTROCARDIOGRAM TRACING: CPT | Performed by: PHYSICIAN ASSISTANT

## 2022-12-05 PROCEDURE — 87636 SARSCOV2 & INF A&B AMP PRB: CPT

## 2022-12-05 PROCEDURE — 80053 COMPREHEN METABOLIC PANEL: CPT

## 2022-12-05 PROCEDURE — 6360000002 HC RX W HCPCS: Performed by: PHYSICIAN ASSISTANT

## 2022-12-05 PROCEDURE — 81003 URINALYSIS AUTO W/O SCOPE: CPT

## 2022-12-05 PROCEDURE — 99285 EMERGENCY DEPT VISIT HI MDM: CPT

## 2022-12-05 PROCEDURE — 6360000004 HC RX CONTRAST MEDICATION: Performed by: PHYSICIAN ASSISTANT

## 2022-12-05 PROCEDURE — 85025 COMPLETE CBC W/AUTO DIFF WBC: CPT

## 2022-12-05 PROCEDURE — 74177 CT ABD & PELVIS W/CONTRAST: CPT

## 2022-12-05 PROCEDURE — 36415 COLL VENOUS BLD VENIPUNCTURE: CPT

## 2022-12-05 RX ORDER — HYDROCODONE BITARTRATE AND ACETAMINOPHEN 5; 325 MG/1; MG/1
1 TABLET ORAL EVERY 6 HOURS PRN
Status: DISCONTINUED | OUTPATIENT
Start: 2022-12-05 | End: 2022-12-05 | Stop reason: HOSPADM

## 2022-12-05 RX ORDER — CYCLOBENZAPRINE HCL 10 MG
10 TABLET ORAL 3 TIMES DAILY PRN
Qty: 21 TABLET | Refills: 0 | Status: SHIPPED | OUTPATIENT
Start: 2022-12-05 | End: 2022-12-15

## 2022-12-05 RX ORDER — KETOROLAC TROMETHAMINE 30 MG/ML
15 INJECTION, SOLUTION INTRAMUSCULAR; INTRAVENOUS ONCE
Status: COMPLETED | OUTPATIENT
Start: 2022-12-05 | End: 2022-12-05

## 2022-12-05 RX ORDER — NAPROXEN 500 MG/1
500 TABLET ORAL 2 TIMES DAILY PRN
Qty: 180 TABLET | Refills: 1 | Status: SHIPPED | OUTPATIENT
Start: 2022-12-05

## 2022-12-05 RX ORDER — DICYCLOMINE HYDROCHLORIDE 10 MG/ML
20 INJECTION INTRAMUSCULAR ONCE
Status: COMPLETED | OUTPATIENT
Start: 2022-12-05 | End: 2022-12-05

## 2022-12-05 RX ADMIN — KETOROLAC TROMETHAMINE 15 MG: 30 INJECTION, SOLUTION INTRAMUSCULAR; INTRAVENOUS at 11:45

## 2022-12-05 RX ADMIN — DICYCLOMINE HYDROCHLORIDE 20 MG: 20 INJECTION, SOLUTION INTRAMUSCULAR at 16:19

## 2022-12-05 RX ADMIN — IOPAMIDOL 75 ML: 755 INJECTION, SOLUTION INTRAVENOUS at 15:11

## 2022-12-05 RX ADMIN — HYDROCODONE BITARTRATE AND ACETAMINOPHEN 1 TABLET: 5; 325 TABLET ORAL at 16:18

## 2022-12-05 ASSESSMENT — ENCOUNTER SYMPTOMS
RESPIRATORY NEGATIVE: 1
VOMITING: 0
COUGH: 0
DIARRHEA: 0
ABDOMINAL PAIN: 1
SHORTNESS OF BREATH: 0
NAUSEA: 0

## 2022-12-05 ASSESSMENT — PAIN SCALES - GENERAL
PAINLEVEL_OUTOF10: 7

## 2022-12-05 ASSESSMENT — PAIN DESCRIPTION - ORIENTATION: ORIENTATION: RIGHT

## 2022-12-05 ASSESSMENT — PAIN - FUNCTIONAL ASSESSMENT: PAIN_FUNCTIONAL_ASSESSMENT: 0-10

## 2022-12-05 ASSESSMENT — PAIN DESCRIPTION - DESCRIPTORS: DESCRIPTORS: SHARP

## 2022-12-05 ASSESSMENT — PAIN DESCRIPTION - LOCATION: LOCATION: ABDOMEN

## 2022-12-05 NOTE — ED NOTES
1141  12 lead ECG completed, given to Dr. Haleigh Boothe for review     [facility]  12/05/22 1144

## 2022-12-05 NOTE — ED PROVIDER NOTES
[address] ED  EMERGENCY DEPARTMENT ENCOUNTER        Pt Name: Elena Tesfaye  MRN: [id number]  [place] 1960  Date of evaluation: 12/5/2022  Provider: Celso Aparicio PA-C  PCP: Davy Arreola DO  Note Started: 11:05 AM EST 12/5/22          MARIVEL. I have evaluated this patient. My supervising physician was available for consultation. CHIEF COMPLAINT       Chief Complaint   Patient presents with    Abdominal Pain     C/o right sided abd pain that started 2 days ago, denies any injuries, states it started all of a sudden, moving makes it worse, denies n/v/d or any difficulties urinating. HISTORY OF PRESENT ILLNESS   (Location, Timing/Onset, Context/Setting, Quality, Duration, Modifying Factors, Severity, Associated Signs and Symptoms)  Note limiting factors. Chief Complaint: right upper abdominal pain; right sided flank pain     Elena Tesfaye is a 63 y.o. female with a past medical history of diabetes, hyperlipidemia, schizoaffective schizophrenia, hypertension, breast cancer not currently undergoing chemo or radiation,  h/o therapeutic radiation, former smoker brought in today by private vehicle with complaints of 3-day history of right-sided upper abdominal pain which radiates to the right flank. Onset of symptoms x3 days. Duration of symptoms have been persistent since onset. Context includes right upper quadrant abdominal pain with radiation to the right flank. She denies chest pain or shortness of breath. She denies fevers chills nausea or vomiting. She denies cough or congestion. She denies urinary complaints. Denies diarrhea. She did take Tylenol this morning with some relief of the pain. Rates her current pain a 7 out of 10 with radiation to the right flank. No aggravating symptoms. No alleviating symptoms. Denies any heavy lifting twisting or bending. Denies any injury or trauma. Denies any sick contacts or recent abdominal surgeries.   Reports she has never had pain like this before. Nothing seems to make symptoms better or worse. She otherwise denies any other complaints. Nursing Notes were all reviewed and agreed with or any disagreements were addressed in the HPI. REVIEW OF SYSTEMS    (2-9 systems for level 4, 10 or more for level 5)     Review of Systems   Constitutional: Negative. HENT: Negative. Respiratory: Negative. Negative for cough and shortness of breath. Cardiovascular: Negative. Negative for chest pain. Gastrointestinal:  Positive for abdominal pain. Negative for diarrhea, nausea and vomiting. Genitourinary:  Positive for flank pain. Negative for difficulty urinating, dysuria, frequency, hematuria and urgency. Skin: Negative. Negative for rash. Neurological: Negative. Positives and Pertinent negatives as per HPI. Except as noted above in the ROS, all other systems were reviewed and negative.        PAST MEDICAL HISTORY     Past Medical History:   Diagnosis Date    Breast cancer ([facility]     Cancer ([facility]     Diabetes mellitus ([facility]     Ectopic pregnancy     History of therapeutic radiation     Hyperlipidemia     Hypertension     Schizo affective schizophrenia ([facility] [facility]          SURGICAL HISTORY     Past Surgical History:   Procedure Laterality Date    BREAST SURGERY      DILATION AND CURETTAGE OF UTERUS      [facility] BREAST BIOPSY LEFT Left 2/24/2021    [facility] BREAST BIOPSY LEFT 2/24/2021 [facility]    [facility] BREAST BIOPSY LEFT Left 2/24/2021    [facility] BREAST BIOPSY LEFT 2/24/2021 [facility]    MASTECTOMY Right 9/14/2021    RIGHT SIMPLE MASTECTOMY, RIGHT RADIOFREQUENCY IDENTIFICATION TAG LOCALIZED SENTINEL LYMPH NODE BIOPSY  CPT CODE - 11891 performed by Harshil Shabazz MD at [address] [facility] Right 2/24/2021     BREAST BIOPSY NEEDLE ADDITIONAL RIGHT 2/24/2021 Gabby Rosales MD [facility] BREAST BIOPSY NEEDLE ADDITIONAL RIGHT Right 2/24/2021    [facility] BREAST BIOPSY NEEDLE ADDITIONAL RIGHT 2/24/2021 Lenora Frost MD [facility] [address] BREAST NEEDLE BIOPSY RIGHT Right 2/24/2021    [facility] BREAST NEEDLE BIOPSY RIGHT 2/24/2021 Lenora Frost MD [facility] [address] NEEDLE LOC OF RIGHT BREAST Right 9/8/2021    [facility] GUIDED NEEDLE LOC OF RIGHT BREAST 9/8/2021 Lenora Frost MD [facility]  [facility] [address]       Previous Medications    ALCOHOL SWABS (B-D SINGLE USE SWABS REGULAR) PADS        ANASTROZOLE (ARIMIDEX) 1 MG TABLET        ASPIRIN LOW DOSE 81 MG EC TABLET    L/D taken Wednesday    ATORVASTATIN (LIPITOR) 40 MG TABLET    Take 40 mg by mouth daily     B-D ULTRAFINE III SHORT PEN 31G X 8 MM MISC        DICLOFENAC (VOLTAREN) 75 MG EC TABLET    Take 75 mg by mouth daily as needed     INSULIN DEGLUDEC (TRESIBA FLEXTOUCH) 100 UNIT/ML SOPN    Inject 36 Units into the skin nightly     INSULIN ZINC EXTENDED HUMAN (HUMULIN U SC)    Inject 6 Units into the skin 3 times daily (before meals)    JARDIANCE 10 MG TABLET    10 mg daily     LEVOTHYROXINE (SYNTHROID) 75 MCG TABLET    Take 75 mcg by mouth Daily     LISINOPRIL-HYDROCHLOROTHIAZIDE (PRINZIDE;ZESTORETIC) 20-12.5 MG PER TABLET    Take 1 tablet by mouth daily     MECLIZINE (ANTIVERT) 25 MG TABLET    Take 25 mg by mouth 3 times daily as needed    METFORMIN (GLUCOPHAGE-XR) 500 MG EXTENDED RELEASE TABLET    Take 1,000 mg by mouth daily (with breakfast)     MULTIPLE VITAMIN (MULTIVITAMIN) TABLET    Take 1 tablet by mouth daily    SERTRALINE (ZOLOFT) 50 MG TABLET    Take 50 mg by mouth daily     SITAGLIPTIN (JANUVIA) 100 MG TABLET    Take 100 mg by mouth every morning    TRULICITY 1.5 WJ/1.5XN SOPN    1.5 mg once a week     ZIPRASIDONE (GEODON) 80 MG CAPSULE    Take 80 mg by mouth 2 times daily (with meals)          ALLERGIES     Doxycycline and Penicillins    FAMILYHISTORY       Family History   Problem Relation Age of Onset    Coronary Art Dis Father           SOCIAL HISTORY       Social History     Tobacco Use    Smoking status: Former     Types: Cigarettes     Quit date: 2006     Years since quittin.8    Smokeless tobacco: Never   Vaping Use    Vaping Use: Never used   Substance Use Topics    Alcohol use: Never    Drug use: Never       SCREENINGS    Luck Coma Scale  Eye Opening: Spontaneous  Best Verbal Response: Oriented  Best Motor Response: Obeys commands  Ruth Coma Scale Score: 15        PHYSICAL EXAM    (up to 7 for level 4, 8 or more for level 5)     ED Triage Vitals   BP Temp Temp src Pulse Resp SpO2 Height Weight   -- -- -- -- -- -- -- --       Physical Exam  Vitals and nursing note reviewed. Constitutional:       General: She is awake. She is not in acute distress. Appearance: Normal appearance. She is well-developed. She is morbidly obese. She is not ill-appearing, toxic-appearing or diaphoretic. HENT:      Head: Normocephalic and atraumatic. Nose: Nose normal.   Eyes:      General:         Right eye: No discharge. Left eye: No discharge. Cardiovascular:      Rate and Rhythm: Normal rate and regular rhythm. Pulses:           Radial pulses are 2+ on the right side and 2+ on the left side. Heart sounds: Normal heart sounds. No murmur heard. No gallop. Pulmonary:      Effort: Pulmonary effort is normal. No respiratory distress. Breath sounds: Normal breath sounds. No decreased breath sounds, wheezing, rhonchi or rales. Chest:      Chest wall: No tenderness. Abdominal:      General: Abdomen is flat. Palpations: Abdomen is soft. Tenderness: There is abdominal tenderness in the right upper quadrant. There is right CVA tenderness. There is no left CVA tenderness, guarding or rebound. Negative signs include Maya's sign and McBurney's sign. Comments: No skin changes to abdomen, no rashes or lesions noted.     Musculoskeletal:         General: No deformity. Normal range of motion. Cervical back: Normal range of motion and neck supple. Right lower leg: No edema. Left lower leg: No edema. Skin:     General: Skin is warm and dry. Neurological:      Mental Status: She is alert and oriented to person, place, and time. Psychiatric:         Behavior: Behavior normal. Behavior is cooperative. DIAGNOSTIC RESULTS   LABS:    Labs Reviewed   URINALYSIS WITH REFLEX TO CULTURE - Abnormal; Notable for the following components:       Result Value    Glucose, Ur >=1000 (*)     All other components within normal limits   COMPREHENSIVE METABOLIC PANEL W/ REFLEX TO MG FOR LOW K - Abnormal; Notable for the following components:    Sodium 134 (*)     Chloride 96 (*)     Glucose 104 (*)     AST 12 (*)     All other components within normal limits   COVID-19 & INFLUENZA COMBO   CBC WITH AUTO DIFFERENTIAL   SPECIMEN REJECTION   LIPASE   TROPONIN       When ordered only abnormal lab results are displayed. All other labs were within normal range or not returned as of this dictation. EKG: When ordered, EKG's are interpreted by the Emergency Department Physician in the absence of a cardiologist.  Please see their note for interpretation of EKG. RADIOLOGY:   Non-plain film images such as CT, Ultrasound and MRI are read by the radiologist. Plain radiographic images are visualized and preliminarily interpreted by the ED Provider with the below findings:        Interpretation per the Radiologist below, if available at the time of this note:    CT ABDOMEN PELVIS W IV CONTRAST Additional Contrast? None   Final Result   1. No acute abdominal or pelvic findings. 2.  Hepatic steatosis. 3.  Sigmoid colon diverticulosis without evidence of diverticulitis      4. Stable appearance of the uterus consistent with fibroids         XR CHEST PORTABLE   Final Result   No evidence of acute process.       Prominent heart size and vascular markings likely related to low lung volumes. No results found. PROCEDURES   Unless otherwise noted below, none     Procedures    CRITICAL CARE TIME       CONSULTS:  None      EMERGENCY DEPARTMENT COURSE and DIFFERENTIAL DIAGNOSIS/MDM:   Vitals:    Vitals:    12/05/22 1117   BP: (!) 152/79   Pulse: 74   Resp: 15   Temp: 97.5 °F (36.4 °C)   TempSrc: Oral   SpO2: 98%   Weight: (!) 330 lb (149.7 kg)   Height: 5' 3\" (1.6 m)       Patient was given the following medications:  Medications   HYDROcodone-acetaminophen (NORCO) 5-325 MG per tablet 1 tablet (1 tablet Oral Given 12/5/22 1618)   ketorolac (TORADOL) injection 15 mg (15 mg IntraVENous Given 12/5/22 1145)   iopamidol (ISOVUE-370) 76 % injection 75 mL (75 mLs IntraVENous Given 12/5/22 1511)   dicyclomine (BENTYL) injection 20 mg (20 mg IntraMUSCular Given 12/5/22 1619)         Is this patient to be included in the SEP-1 Core Measure due to severe sepsis or septic shock? No   Exclusion criteria - the patient is NOT to be included for SEP-1 Core Measure due to: Infection is not suspected    Patient brought in today by private vehicle with complaints of right upper quadrant abdominal pain with radiation to the right flank. On exam patient is alert oriented afebrile breathing on room air satting at 98%. Patient is nontoxic in appearance and in no acute respiratory distress. Old labs records reviewed at this time. CBC shows no white count. Hemoglobin of 14.5. Urine negative for infection. EKG obtained, interpreted by my attending please see note. Patient received IV Toradol here in the ED. chest x-ray shows no acute process. Prominent heart size and vascular markings likely related to low lung volumes. COVID and FLU negative. CMP shows no electrolyte abnormalities. Kidney function liver function unremarkable. Troponin less than 0.01. Lipase of 32. CT scan shows no acute findings. Hepatic steatosis.   Sigmoid colonic diverticulosis without evidence of diverticulitis. Stable appearance of the uterus consistent with fibroids. Patient was updated on all findings. Patient given additional pain medication here. Upon reevaluation she is feeling better. Plan will be to discharge home with supportive care measures. She was given Naprosyn and Flexeril for concern for possible muscular skeletal etiology. She was told to call and schedule close follow-up with her PCP in the next 1 to 2 days. She was instructed to return to the ED if she developed any new or worsening symptoms. She verbalized understanding. Patient discharged in stable condition. FINAL IMPRESSION      1. Abdominal pain, right upper quadrant    2. Hepatic steatosis    3.  Uterine leiomyoma, unspecified location          DISPOSITION/PLAN   DISPOSITION Decision To Discharge 12/05/2022 04:26:09 PM      PATIENT REFERRED TO:  Hannah Moyer DO  [address] [place] [address]  [phone]    Schedule an appointment as soon as possible for a visit in 1 day  For a recheck in  days    [facility] ED  [address] [address]  [phone]  Schedule an appointment as soon as possible for a visit   As needed, If symptoms worsen    DISCHARGE MEDICATIONS:  New Prescriptions    CYCLOBENZAPRINE (FLEXERIL) 10 MG TABLET    Take 1 tablet by mouth 3 times daily as needed for Muscle spasms    NAPROXEN (NAPROSYN) 500 MG TABLET    Take 1 tablet by mouth 2 times daily as needed for Pain       DISCONTINUED MEDICATIONS:  Discontinued Medications    No medications on file              (Please note that portions of this note were completed with a voice recognition program.  Efforts were made to edit the dictations but occasionally words are mis-transcribed.)    Sherif Steiner PA-C (electronically signed)            Sherif Steiner PA-C  12/05/22 [id number]

## 2022-12-05 NOTE — ED PROVIDER NOTES
I was not asked to see this patient. I was available for consultation if deemed necessary. I was only asked to interpret the EKG. Please see YARELIS Mejia's note for care of the patient while in the emergency department and for final disposition. The Ekg interpreted by me shows  normal sinus rhythm with a rate of 68  Axis is   Normal  QTc is  normal  Intervals and Durations are unremarkable.       ST Segments: normal  No significant change from prior EKG dated - no old EKG  No STEMI           Oniel Vo MD  12/07/22 0110

## 2022-12-05 NOTE — DISCHARGE INSTRUCTIONS
Please continue with Naprosyn, Flexeril for the pain. Please call your family physician to schedule close follow-up in the next 1 to 2 days.   Return to the ER if you develop any new or worsening symptoms

## 2022-12-05 NOTE — ED NOTES
Green top tube redrawn due to prior sample being hemolyzed.       Abigail Whitfield RN  12/05/22 5717

## 2023-03-03 ENCOUNTER — HOSPITAL ENCOUNTER (OUTPATIENT)
Dept: WOMENS IMAGING | Age: 63
Discharge: HOME | End: 2023-03-03
Payer: COMMERCIAL

## 2023-03-03 DIAGNOSIS — Z12.31 ENCOUNTER FOR SCREENING MAMMOGRAM FOR BREAST CANCER: ICD-10-CM

## 2023-03-03 DIAGNOSIS — Z85.3 PERSONAL HISTORY OF MALIGNANT NEOPLASM OF BREAST: ICD-10-CM

## 2023-03-03 PROCEDURE — 77063 BREAST TOMOSYNTHESIS BI: CPT

## 2024-04-04 ENCOUNTER — HOSPITAL ENCOUNTER (OUTPATIENT)
Dept: ULTRASOUND IMAGING | Age: 64
Discharge: HOME | End: 2024-04-04
Payer: COMMERCIAL

## 2024-04-04 DIAGNOSIS — R22.32 MASS OF LEFT FOREARM: ICD-10-CM

## 2024-04-04 PROCEDURE — 76882 US LMTD JT/FCL EVL NVASC XTR: CPT

## 2024-05-03 ENCOUNTER — HOSPITAL ENCOUNTER (OUTPATIENT)
Dept: WOMENS IMAGING | Age: 64
Discharge: HOME | End: 2024-05-03
Payer: COMMERCIAL

## 2024-05-03 DIAGNOSIS — Z12.31 SCREENING MAMMOGRAM, ENCOUNTER FOR: ICD-10-CM

## 2024-05-03 PROCEDURE — 77063 BREAST TOMOSYNTHESIS BI: CPT

## 2025-05-14 ENCOUNTER — HOSPITAL ENCOUNTER (OUTPATIENT)
Dept: WOMENS IMAGING | Age: 65
Discharge: HOME | End: 2025-05-14
Payer: COMMERCIAL

## 2025-05-14 DIAGNOSIS — C50.411 CARCINOMA OF UPPER-OUTER QUADRANT OF FEMALE BREAST, RIGHT (HCC): ICD-10-CM

## 2025-05-14 DIAGNOSIS — Z12.31 ENCOUNTER FOR SCREENING MAMMOGRAM FOR BREAST CANCER: ICD-10-CM

## 2025-05-14 DIAGNOSIS — N95.8 POSTARTIFICIAL MENOPAUSAL SYNDROME: ICD-10-CM

## 2025-05-14 PROCEDURE — 77080 DXA BONE DENSITY AXIAL: CPT

## 2025-05-14 PROCEDURE — 77067 SCR MAMMO BI INCL CAD: CPT

## (undated) DEVICE — SYRINGE MED 10ML TRNSLUC BRL PLUNG BLK MRK POLYPR CTRL

## (undated) DEVICE — DRAIN SURG 15FR L3/16IN DIA4.7MM SIL CHN RND HUBLESS FULL

## (undated) DEVICE — SUTURE ETHLN SZ 2-0 L18IN NONABSORBABLE BLK L26MM FS 3/8 664G

## (undated) DEVICE — DRESSING GRMCDL 6 12FR D1N CNTR HOLE 4MM ANTMCRBL PRTCTVE DI

## (undated) DEVICE — CAMERA COVER: Brand: UNBRANDED

## (undated) DEVICE — 96"PROBE COVER (US)10PK: Brand: SITE-RITE

## (undated) DEVICE — GAUZE,SPONGE,4"X4",8PLY,STRL,LF,10/TRAY: Brand: MEDLINE

## (undated) DEVICE — Device

## (undated) DEVICE — YANKAUER,OPEN TIP,W/O VENT,STERILE: Brand: MEDLINE INDUSTRIES, INC.

## (undated) DEVICE — RESERVOIR,SUCTION,100CC,SILICONE: Brand: MEDLINE

## (undated) DEVICE — BLADE ES ELASTOMERIC COAT INSUL DURABLE BEND UPTO 90DEG

## (undated) DEVICE — DRAPE SHEET ULTRAGARD: Brand: MEDLINE

## (undated) DEVICE — SOLUTION IRRIG 1000ML 0.9% SOD CHL USP POUR PLAS BTL

## (undated) DEVICE — SUTURE PERMA HND 2-0 L18IN NONABSORBABLE BLK X-1 L22IN 1/2 737G

## (undated) DEVICE — GLOVE,SURG,SENSICARE SLT,LF,PF,6.5: Brand: MEDLINE

## (undated) DEVICE — ADHESIVE SKIN CLSR 0.7ML TOP DERMBND ADV

## (undated) DEVICE — NEEDLE HYPO 25GA L1.5IN BLU POLYPR HUB S STL REG BVL STR

## (undated) DEVICE — SUTURE VCRL + SZ 3-0 L27IN ABSRB UD L26MM SH 1/2 CIR VCP416H

## (undated) DEVICE — GOWN SIRUS NONREIN LG W/TWL: Brand: MEDLINE INDUSTRIES, INC.

## (undated) DEVICE — SUTURE STRATAFIX SPRL SZ 2-0 L14IN ABSRB VLT MH L36MM 1/2 SXPD2B412

## (undated) DEVICE — Z CONVERTED USE 2271043 CONTAINER SPEC COLL 4OZ SCR ON LID PEEL PCH

## (undated) DEVICE — 3M™ TEGADERM™ TRANSPARENT FILM DRESSING FRAME STYLE, 1624W, 2-3/8 IN X 2-3/4 IN (6 CM X 7 CM), 100/CT 4CT/CASE: Brand: 3M™ TEGADERM™